# Patient Record
Sex: FEMALE | Race: WHITE | HISPANIC OR LATINO | Employment: STUDENT | URBAN - METROPOLITAN AREA
[De-identification: names, ages, dates, MRNs, and addresses within clinical notes are randomized per-mention and may not be internally consistent; named-entity substitution may affect disease eponyms.]

---

## 2017-06-06 ENCOUNTER — ALLSCRIPTS OFFICE VISIT (OUTPATIENT)
Dept: OTHER | Facility: OTHER | Age: 42
End: 2017-06-06

## 2017-06-06 DIAGNOSIS — Z12.31 ENCOUNTER FOR SCREENING MAMMOGRAM FOR MALIGNANT NEOPLASM OF BREAST: ICD-10-CM

## 2017-07-21 ENCOUNTER — ALLSCRIPTS OFFICE VISIT (OUTPATIENT)
Dept: OTHER | Facility: OTHER | Age: 42
End: 2017-07-21
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
## 2018-01-10 NOTE — PROGRESS NOTES
Assessment    1  Exposure to chickenpox (V01 71) (Z20 820)   2  Exposure to hepatitis B (V01 79) (Z20 5)   3  ADHD (attention deficit hyperactivity disorder), combined type (314 01) (F90 2)   4  Allergic rhinitis due to pollen (477 0) (J30 1)   5  Need for prophylactic vaccination and inoculation against influenza (V04 81) (Z23)   6  Pre-employment examination (V70 5) (Z02 1)    Plan  ADHD (attention deficit hyperactivity disorder), combined type    · Methylphenidate HCl - 20 MG Oral Tablet (Ritalin); TAKE 1 TABLET DAILY AS  DIRECTED   · Methylphenidate HCl ER 54 MG Oral Tablet Extended Release; TAKE 1 TABLET  ONCE DAILY  Allergic rhinitis due to pollen    · Fluticasone Propionate 50 MCG/ACT Nasal Suspension; USE 2 SPRAYS IN EACH  NOSTRIL ONCE DAILY   · Loratadine 10 MG Oral Tablet; TAKE 1 TABLET DAILY AS NEEDED  Exposure to chickenpox    · (1) VARICELLA ZOSTER IMMUNITY(IGG); Status:Active; Requested [id number];   Exposure to hepatitis B    · (Q) HEPATITIS B SURFACE ANTIBODY QL; Status:Active; Requested [id number]; Health Maintenance    · PPD  Insomnia    · Zolpidem Tartrate 10 MG Oral Tablet; TAKE 1 TABLET AT BEDTIME AS NEEDED  FOR INSOMNIA  Need for prophylactic vaccination and inoculation against influenza    · Fluzone Quadrivalent 0 5 ML Intramuscular Suspension    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      History of Present Illness  HM, Adult Female: The patient is being seen for a health maintenance evaluation  The last health maintenance visit was month(s) ago  General Health:   Screening:   HPI: She has ADHD as diagnosed by Dr Maxine Ambriz in [place]  She is on Concerta and Ritalin without side effects  She has no anxiety, insomnia, or palpitations  She is doing well on these medications  Her grades are improving on her current regimen  Unfortunately, her psychiatrist is no longer going to be in the area and she asked that we take over her prescriptions  She complains cough and coryza  She's had runny nose, itchy eyes, sputum production for the past week or so  She believes this is her allergies  She is not taking anything for currently  She is afraid of taking anything that would be sedating  Review of Systems    Constitutional: No fever, no chills, feels well, no tiredness, no recent weight gain or weight loss  Eyes: No complaints of eye pain, no red eyes, no eyesight problems, no discharge, no dry eyes, no itching of eyes  ENT: as noted in HPI  Cardiovascular: No complaints of slow heart rate, no fast heart rate, no chest pain, no palpitations, no leg claudication, no lower extremity edema  Respiratory: No complaints of shortness of breath, no wheezing, no cough, no SOB on exertion, no orthopnea, no PND  Gastrointestinal: No complaints of abdominal pain, no constipation, no nausea or vomiting, no diarrhea, no bloody stools  Genitourinary: No complaints of dysuria, no incontinence, no pelvic pain, no dysmenorrhea, no vaginal discharge or bleeding  Musculoskeletal: No complaints of arthralgias, no myalgias, no joint swelling or stiffness, no limb pain or swelling  Integumentary: No complaints of skin rash or lesions, no itching, no skin wounds, no breast pain or lump  Neurological: No complaints of headache, no confusion, no convulsions, no numbness, no dizziness or fainting, no tingling, no limb weakness, no difficulty walking  Psychiatric: Not suicidal, no sleep disturbance, no anxiety or depression, no change in personality, no emotional problems  Endocrine: No complaints of proptosis, no hot flashes, no muscle weakness, no deepening of the voice, no feelings of weakness  Hematologic/Lymphatic: No complaints of swollen glands, no swollen glands in the neck, does not bleed easily, does not bruise easily  Active Problems    1  Anxiety disorder (300 00) (F41 9)   2  Common migraine without aura (346 10) (G43 009)   3  Esophageal reflux (530 81) (K21 9)   4  Galactorrhea not associated with childbirth (611 6) (N64 3)   5  Hyperglycemia (790 29) (R73 9)   6  Insomnia (780 52) (G47 00)    Past Medical History    · History of Diabetes During Pregnancy   · History of herpes labialis (V12 09) (Z86 19)   · History of influenza (V12 09) (Z87 09)   · History of low back pain (V13 59) (Z87 39)   · History of sprain of ankle (V13 59) (Z87 828)   · History of Shoulder joint pain, unspecified laterality   · History of Skin rash (782 1) (R21)    Surgical History    · History of Tonsillectomy With Adenoidectomy    Family History  Mother    · Family history of Anxiety Disorder NOS   · Family history of Depression  Father    · Family history of Diabetes Mellitus (V18 0)  Son    · Family history of Chromosomal Anomalies  Brother    · Family history of Diabetes Mellitus (V18 0)   · Family history of Diabetes Mellitus (V18 0)    Social History    · Never A Smoker    Current Meds   1  Methylphenidate HCl ER 54 MG Oral Tablet Extended Release; TAKE 1 TABLET ONCE   DAILY; Therapy: (Recorded:24Smb7507) to Recorded   2  Ritalin 20 MG Oral Tablet; TAKE 1 TABLET DAILY AS DIRECTED; Therapy: (Recorded:02Xak9917) to Recorded   3  Zolpidem Tartrate 10 MG Oral Tablet; TAKE 1 TABLET AT BEDTIME AS NEEDED FOR   INSOMNIA; Therapy: 63VHH9695 to (Dayday Bhakta)  Requested for: 44LKW6464; Last   IE:11SLB8715 Ordered    Allergies    1  No Known Drug Allergies    Vitals   Recorded: 02Jun2016 11:33AM   Heart Rate 87   Respiration 12   Systolic 563   Diastolic 78   Height 5 ft 5 in   Weight 151 lb 4 oz   BMI Calculated 25 17   BSA Calculated 1 76   O2 Saturation 99     Physical Exam    Constitutional   General appearance: No acute distress, well appearing and well nourished  Pulmonary   Respiratory effort: No increased work of breathing or signs of respiratory distress  Auscultation of lungs: Clear to auscultation      Cardiovascular   Auscultation of heart: Normal rate and rhythm, normal S1 and S2, no murmurs  Carotid pulses: 2+ bilaterally  Abdomen   Abdomen: Non-tender, no masses  Liver and spleen: No hepatomegaly or splenomegaly         Future Appointments    Date/Time Provider Specialty Site   06/14/2016 09:00 AM [facility], Nurse Schedule  [address] [place] [address]     Signatures   Electronically signed by : Jude Reyes MD; Jun 2 2016  3:03PM EST                       (Author)

## 2018-01-12 VITALS
OXYGEN SATURATION: 98 % | RESPIRATION RATE: 14 BRPM | WEIGHT: 159 LBS | HEART RATE: 83 BPM | SYSTOLIC BLOOD PRESSURE: 118 MMHG | BODY MASS INDEX: 26.49 KG/M2 | DIASTOLIC BLOOD PRESSURE: 70 MMHG | HEIGHT: 65 IN

## 2018-01-12 NOTE — PROGRESS NOTES
Chief Complaint  PPD PLACED ON PT, HEP B GIVEN ALSO      Active Problems    1  ADHD (attention deficit hyperactivity disorder), combined type (314 01) (F90 2)   2  Allergic rhinitis due to pollen (477 0) (J30 1)   3  Anxiety disorder (300 00) (F41 9)   4  Common migraine without aura (346 10) (G43 009)   5  Esophageal reflux (530 81) (K21 9)   6  Exposure to chickenpox (V01 71) (Z20 820)   7  Exposure to hepatitis B (V01 79) (Z20 5)   8  Galactorrhea not associated with childbirth (611 6) (N64 3)   9  Hyperglycemia (790 29) (R73 9)   10  Insomnia (780 52) (G47 00)   11  Need for prophylactic vaccination and inoculation against influenza (V04 81) (Z23)   12  Pre-employment examination (V70 5) (Z02 1)    Current Meds   1  Fluticasone Propionate 50 MCG/ACT Nasal Suspension; USE 2 SPRAYS IN EACH   NOSTRIL ONCE DAILY; Therapy: 31NXV6535 to (Last Rx:02Jun2016)  Requested for: 02Jun2016 Ordered   2  Loratadine 10 MG Oral Tablet; TAKE 1 TABLET DAILY AS NEEDED; Therapy: 97HRG4163 to (Huan Gandhi)  Requested for: 22MJQ0179; Last   Rx:02Jun2016 Ordered   3  Methylphenidate HCl - 20 MG Oral Tablet (Ritalin); TAKE 1 TABLET DAILY AS   DIRECTED; Last Rx:02Jun2016 Ordered   4  Methylphenidate HCl ER 54 MG Oral Tablet Extended Release; TAKE 1 TABLET ONCE   DAILY; Last Rx:02Jun2016 Ordered   5  Zolpidem Tartrate 10 MG Oral Tablet; TAKE 1 TABLET AT BEDTIME AS NEEDED FOR   INSOMNIA; Therapy: 77KMD6112 to (Huan Gandhi)  Requested for: 66CJX2848; Last   Rx:02Jun2016 Ordered    Allergies    1   No Known Drug Allergies    Plan  Exposure to hepatitis B    · Hep B (Recombinant)    Signatures   Electronically signed by : Deana Palacios MD; Jul 8 2016  9:33AM EST                       (Author)

## 2018-01-13 NOTE — PROGRESS NOTES
Assessment    1  Encounter for preventive health examination (V70 0) (Z00 00)    Plan  Encounter for screening mammogram for malignant neoplasm of breast    · * MAMMO SCREENING BILATERAL W CAD; Status:Hold For - Scheduling,Retrospective  By Protocol Authorization; Requested [id number]; Health Maintenance    · Begin a limited exercise program ; Status:Complete;   Done: 94TKU5070   · Begin or continue regular aerobic exercise  Gradually work up to at least 3 sessions of 30  minutes of exercise a week ; Status:Complete;   Done: 45QKY5599   · Brush your teeth 3 times a day and floss at least once a day ; Status:Complete;   Done:  35LDC5347   · Eat a low fat and low cholesterol diet ; Status:Complete;   Done: 15FGT5028   · Keep a diary of when and what you eat ; Status:Complete;   Done: 63RYC9347   · Limit your use of alcohol to 2 drinks or cans of beer a day ; Status:Complete;   Done:  96JIE3097   · Schedule an appointment in 48-72 hours to have your TB (tuberculin) skin test  interpreted by a trained healthcare provider ; Status:Complete;   Done: 07CUS6596   · Some eating tips that can help you lose weight ; Status:Complete;   Done: 64PWN6992   · Stretch and warm up your muscles during the first 10 minutes , then cool down your  muscles for the last 10 minutes of exercise ; Status:Complete;   Done: 75KBS0301   · There are ways to decrease your stress and improve your sense of well-being  We  encourage you to keep active and exercise regularly  Make time to take care of yourself  and participate in activities that you enjoy  Stay connected to friends and family that can  support and comfort you  If at any time you have thoughts of harming yourself or  someone else, contact us immediately ; Status:Active; Requested [id number];    · We encourage all of our patients to exercise regularly    30 minutes of exercise or physical  activity five or more days a week is recommended for children and adults ;  Status:Complete;   Done: 98ZZV9321   · We encourage you to begin to make lifestyle changes to help control your blood  pressure  These may include losing weight, increasing your activity level, limiting salt in  your diet, decreasing alcohol intake, and eating a diet low in fat and rich in fruits  and vegetables ; Status:Complete;   Done: 96YDQ3192   · We recommend routine visits to a dentist ; Status:Complete;   Done: 13WKQ7159   · We recommend that you bring your body mass index down to 26 ; Status:Complete;    Done: 35NGZ1724   · We recommend you modify your diet to achieve and maintain a healthy weight  Being  overweight may increase your risk for developing health problems such as diabetes,  heart disease, and cancer  Avoid high fat foods and eat a balanced diet rich  in fruits and vegetables  The combination of a reduced-calorie diet and increased  physical activity is recommended  Please let us know if you would like to  learn more about your nutrition and calorie needs, and additional options including  weight loss programs that can help you achieve your goals ; Status:Complete;   Done:  03KEE2659   · We recommend you modify your diet to achieve and maintain a healthy weight  Being  underweight may increase your risk of developing health problems from vitamin and  mineral deficiencies  We recommend a balanced diet rich in fruits and vegetables  You  may also consider increasing your calorie intake by eating more frequently or adding  nuts, avocados, and low-fat cheese or milk to your meals    Please let us know  if you would like to learn more about your nutrition and calorie needs, and additional  options to help you achieve your weight goals ; Status:Complete;   Done: 32UPO7407   · Call [phone] if: You find a new or different kind of lump in your breast ;  Status:Complete;   Done: 92VFL8628   · Call [phone] if: You have any warning signs of skin cancer ; Status:Complete;    Done: 73YJC1847   · *VB-Depression Screening; Status:Resulted - Requires Verification,Retrospective By  Protocol Authorization;   Done: 19WSB5838 11:40AM  Need for prophylactic vaccination and inoculation against influenza    · PPD    Discussion/Summary  health maintenance visit Currently, she eats a healthy diet  cervical cancer screening is current Breast cancer screening: the risks and benefits of breast cancer screening were discussed and mammogram has been ordered  Colorectal cancer screening: colorectal cancer screening is not indicated  Osteoporosis screening: bone mineral density testing is not indicated  Patient discussion: discussed with the patient  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  HAS PAPER TO BE FILLED OUT      History of Present Illness  HM, Adult Female: The patient is being seen for a health maintenance evaluation  The last health maintenance visit was 12 month(s) ago  Social History: Household members include 3 daughter(s) and 1 son(s)  She is   Work status: working full time  The patient has never smoked cigarettes  She reports occasional alcohol use  She has never used illicit drugs  General Health: The patient's health since the last visit is described as good  She has regular dental visits  She denies vision problems  She denies hearing loss  Immunizations status: up to date  Lifestyle:  She consumes a diverse and healthy diet  She does not have any weight concerns  She does not exercise regularly  She does not use tobacco  She consumes alcohol  She denies drug use  Reproductive health: the patient is premenopausal   she reports abnormal menses  she uses no contraception  she is not sexually active  she denies prior pregnancies  Screening:   metabolic screening reviewed and updated       Cardiovascular risk factors: no hypertension, no diabetes, no high LDL cholesterol, no low HDL cholesterol, no stress, no obesity, no tobacco use, no illicit drug use, no sedentary lifestyle and no family history of cardiovascular disease  General health risks: no previous breast cancer, no abnormal cervical cytology, no positive screening for human papilloma virus, no hormone therapy, no diethylstilbestrol (MELIA) exposure in utero, no previous colon polyps, no inflammatory bowel disease, no osteoporosis risk factors, no asbestos exposure, no radiation exposure and no occupational exposure to  Safety elements used: seat belt, safe driving habits, sunscreen, smoke detector, carbon monoxide detector, hot water temperature less than 120 degrees F, bathroom grab bars and fall prevention measures  Risk findings: anxiety symptoms and depression symptoms, but no passive smoke exposure, no unsafe sexual behavior, no chemical abuse, no domestic violence, no guns at home, no parenting concerns, no caregiver concerns, no stress management concerns, no anger management concerns, no unsafe driving habits, no multiple driving violations, no history of DUI / DWI, no memory loss, no falls risk, no osteoporosis risk factors, no occupational exposure, no travel to developing areas and no tuberculosis exposure  Review of Systems    Constitutional: No fever, no chills, feels well, no tiredness, no recent weight gain or weight loss  Eyes: No complaints of eye pain, no red eyes, no eyesight problems, no discharge, no dry eyes, no itching of eyes  ENT: no complaints of earache, no loss of hearing, no nose bleeds, no nasal discharge, no sore throat, no hoarseness  Cardiovascular: No complaints of slow heart rate, no fast heart rate, no chest pain, no palpitations, no leg claudication, no lower extremity edema  Respiratory: No complaints of shortness of breath, no wheezing, no cough, no SOB on exertion, no orthopnea, no PND  Gastrointestinal: No complaints of abdominal pain, no constipation, no nausea or vomiting, no diarrhea, no bloody stools     Genitourinary: No complaints of dysuria, no incontinence, no pelvic pain, no dysmenorrhea, no vaginal discharge or bleeding  Musculoskeletal: No complaints of arthralgias, no myalgias, no joint swelling or stiffness, no limb pain or swelling  Integumentary: No complaints of skin rash or lesions, no itching, no skin wounds, no breast pain or lump  Neurological: No complaints of headache, no confusion, no convulsions, no numbness, no dizziness or fainting, no tingling, no limb weakness, no difficulty walking  Psychiatric: Not suicidal, no sleep disturbance, no anxiety or depression, no change in personality, no emotional problems  Endocrine: No complaints of proptosis, no hot flashes, no muscle weakness, no deepening of the voice, no feelings of weakness  Hematologic/Lymphatic: No complaints of swollen glands, no swollen glands in the neck, does not bleed easily, does not bruise easily  Active Problems    1  ADHD (attention deficit hyperactivity disorder), combined type (314 01) (F90 2)   2  Allergic rhinitis due to pollen (477 0) (J30 1)   3  Anxiety disorder (300 00) (F41 9)   4  Common migraine without aura (346 10) (G43 009)   5  Esophageal reflux (530 81) (K21 9)   6  Exposure to chickenpox (V01 71) (Z20 820)   7  Exposure to hepatitis B (V01 79) (Z20 5)   8  Galactorrhea not associated with childbirth (611 6) (N64 3)   9  Hyperglycemia (790 29) (R73 9)   10  Insomnia (780 52) (G47 00)   11   Pre-employment examination (V70 5) (Z02 1)    Past Medical History    · History of Diabetes During Pregnancy   · History of herpes labialis (V12 09) (Z86 19)   · History of influenza (V12 09) (Z87 09)   · History of low back pain (V13 59) (Z87 39)   · History of sprain of ankle (V13 59) (R63 508)   · History of Shoulder joint pain, unspecified laterality   · History of Skin rash (782 1) (R21)    Surgical History    · History of Tonsillectomy With Adenoidectomy    Family History  Mother    · Family history of Anxiety Disorder NOS   · Family history of Depression  Father    · Family history of Diabetes Mellitus (V18 0)  Son    · Family history of Chromosomal Anomalies  Brother    · Family history of Diabetes Mellitus (V18 0)   · Family history of Diabetes Mellitus (V18 0)    Social History    · Never A Smoker    Current Meds   1  BuPROPion HCl ER (SR) 100 MG Oral Tablet Extended Release 12 Hour; Therapy: 47FSX0376 to Recorded   2  Fluticasone Propionate 50 MCG/ACT Nasal Suspension; instill 2 sprays into each nostril   once daily; Therapy: 54STZ2064 to (Evaluate:17Aug2017)  Requested for: 19Apr2017; Last   Rx:19Apr2017 Ordered   3  Loratadine 10 MG Oral Tablet; TAKE 1 TABLET DAILY AS NEEDED; Therapy: 97VIR1743 to (Adriana Luo)  Requested for: 46SES0657; Last   Rx:02Jun2016 Ordered   4  Methylphenidate HCl - 20 MG Oral Tablet; TAKE 1 TABLET DAILY AS DIRECTED; Last   Rx:19Ozw2483 Ordered   5  Methylphenidate HCl ER 54 MG Oral Tablet Extended Release; TAKE 1 TABLET ONCE   DAILY; Last Rx:16Nov2016 Ordered   6  MethylPREDNISolone 4 MG Oral Tablet Therapy Pack; take as directed; Therapy: 73Crr3844 to (Last Rx:37Ter3012)  Requested for: 84Wxy6911 Ordered   7  SUMAtriptan Succinate 25 MG Oral Tablet; take as directed may repeat X1 DOSE; Therapy: 63WBC6979 to (Evaluate:17Oct2016)  Requested for: 90VFY9337; Last   Rx:11Xbq3338 Ordered   8  Zolpidem Tartrate 10 MG Oral Tablet; take 1 tablet by mouth at bedtime if needed for   insomnia; Therapy: 67YQP5688 to (Candice Borges)  Requested for: 33TRL4399; Last   Rx:23Jan2017 Ordered    Allergies    1  No Known Drug Allergies    Vitals   Recorded: 59QMW8611 11:12AM   Heart Rate 83   Respiration 14   Systolic 041   Diastolic 70   Height 5 ft 5 in   Weight 159 lb    BMI Calculated 26 46   BSA Calculated 1 79   O2 Saturation 98     Physical Exam    Constitutional   General appearance: No acute distress, well appearing and well nourished  Neck   Neck: Supple, symmetric, trachea midline, no masses      Thyroid: Normal, no thyromegaly  Pulmonary   Respiratory effort: No increased work of breathing or signs of respiratory distress  Auscultation of lungs: Clear to auscultation  Cardiovascular   Auscultation of heart: Normal rate and rhythm, normal S1 and S2, no murmurs  Carotid pulses: 2+ bilaterally  Abdominal aorta: Normal     Abdomen   Abdomen: Non-tender, no masses  Liver and spleen: No hepatomegaly or splenomegaly  Neurologic   Cranial nerves: Cranial nerves II-XII intact  Cortical function: Normal mental status  Reflexes: 2+ and symmetric  Sensation: No sensory loss  Coordination: Normal finger to nose and heel to shin         Results/Data  *VB-Depression Screening 72IPB1756 11:40AM Emre Josh     Test Name Result Flag Reference   Depression Scale Result      Depression Screen - Positive Findings                         Signatures   Electronically signed by : Fahad Medina MD; Jun 6 2017  2:42PM EST                       (Author)

## 2018-01-14 VITALS
SYSTOLIC BLOOD PRESSURE: 124 MMHG | WEIGHT: 157.38 LBS | BODY MASS INDEX: 26.22 KG/M2 | HEIGHT: 65 IN | HEART RATE: 87 BPM | OXYGEN SATURATION: 98 % | RESPIRATION RATE: 15 BRPM | DIASTOLIC BLOOD PRESSURE: 70 MMHG

## 2018-05-26 DIAGNOSIS — G43.809 OTHER MIGRAINE WITHOUT STATUS MIGRAINOSUS, NOT INTRACTABLE: Primary | ICD-10-CM

## 2018-05-28 DIAGNOSIS — G43.909 MIGRAINE WITHOUT STATUS MIGRAINOSUS, NOT INTRACTABLE, UNSPECIFIED MIGRAINE TYPE: Primary | ICD-10-CM

## 2018-05-28 RX ORDER — METHYLPREDNISOLONE 4 MG/1
TABLET ORAL
Qty: 21 TABLET | Refills: 5 | Status: SHIPPED | OUTPATIENT
Start: 2018-05-28 | End: 2019-04-23

## 2018-05-28 RX ORDER — SUMATRIPTAN 25 MG/1
TABLET, FILM COATED ORAL
Qty: 9 TABLET | Refills: 5 | Status: SHIPPED | OUTPATIENT
Start: 2018-05-28 | End: 2019-04-23

## 2019-03-11 DIAGNOSIS — B00.1 COLD SORE: Primary | ICD-10-CM

## 2019-03-11 RX ORDER — VALACYCLOVIR HYDROCHLORIDE 500 MG/1
TABLET, FILM COATED ORAL
Qty: 30 TABLET | Refills: 5 | Status: SHIPPED | OUTPATIENT
Start: 2019-03-11 | End: 2019-09-01 | Stop reason: SDUPTHER

## 2019-04-23 ENCOUNTER — OFFICE VISIT (OUTPATIENT)
Dept: FAMILY MEDICINE CLINIC | Facility: CLINIC | Age: 44
End: 2019-04-23
Payer: COMMERCIAL

## 2019-04-23 VITALS
SYSTOLIC BLOOD PRESSURE: 106 MMHG | RESPIRATION RATE: 15 BRPM | OXYGEN SATURATION: 98 % | DIASTOLIC BLOOD PRESSURE: 64 MMHG | HEIGHT: 65 IN | WEIGHT: 157.6 LBS | HEART RATE: 86 BPM | BODY MASS INDEX: 26.26 KG/M2

## 2019-04-23 DIAGNOSIS — F33.40 RECURRENT MAJOR DEPRESSIVE DISORDER, IN REMISSION (HCC): Primary | ICD-10-CM

## 2019-04-23 DIAGNOSIS — R63.5 WEIGHT GAIN: ICD-10-CM

## 2019-04-23 LAB
ALBUMIN SERPL BCP-MCNC: 3.9 G/DL (ref 3.5–5)
ALP SERPL-CCNC: 66 U/L (ref 46–116)
ALT SERPL W P-5'-P-CCNC: 31 U/L (ref 12–78)
ANION GAP SERPL CALCULATED.3IONS-SCNC: 5 MMOL/L (ref 4–13)
AST SERPL W P-5'-P-CCNC: 14 U/L (ref 5–45)
BASOPHILS # BLD AUTO: 0.03 THOUSANDS/ΜL (ref 0–0.1)
BASOPHILS NFR BLD AUTO: 0 % (ref 0–1)
BILIRUB SERPL-MCNC: 0.54 MG/DL (ref 0.2–1)
BUN SERPL-MCNC: 16 MG/DL (ref 5–25)
CALCIUM SERPL-MCNC: 8.3 MG/DL (ref 8.3–10.1)
CHLORIDE SERPL-SCNC: 108 MMOL/L (ref 100–108)
CO2 SERPL-SCNC: 26 MMOL/L (ref 21–32)
CREAT SERPL-MCNC: 0.85 MG/DL (ref 0.6–1.3)
EOSINOPHIL # BLD AUTO: 0.13 THOUSAND/ΜL (ref 0–0.61)
EOSINOPHIL NFR BLD AUTO: 2 % (ref 0–6)
ERYTHROCYTE [DISTWIDTH] IN BLOOD BY AUTOMATED COUNT: 13.2 % (ref 11.6–15.1)
GFR SERPL CREATININE-BSD FRML MDRD: 84 ML/MIN/1.73SQ M
GLUCOSE SERPL-MCNC: 84 MG/DL (ref 65–140)
HCT VFR BLD AUTO: 42.6 % (ref 34.8–46.1)
HGB BLD-MCNC: 13.8 G/DL (ref 11.5–15.4)
IMM GRANULOCYTES # BLD AUTO: 0.01 THOUSAND/UL (ref 0–0.2)
IMM GRANULOCYTES NFR BLD AUTO: 0 % (ref 0–2)
LYMPHOCYTES # BLD AUTO: 1.68 THOUSANDS/ΜL (ref 0.6–4.47)
LYMPHOCYTES NFR BLD AUTO: 24 % (ref 14–44)
MCH RBC QN AUTO: 31.3 PG (ref 26.8–34.3)
MCHC RBC AUTO-ENTMCNC: 32.4 G/DL (ref 31.4–37.4)
MCV RBC AUTO: 97 FL (ref 82–98)
MONOCYTES # BLD AUTO: 0.71 THOUSAND/ΜL (ref 0.17–1.22)
MONOCYTES NFR BLD AUTO: 10 % (ref 4–12)
NEUTROPHILS # BLD AUTO: 4.48 THOUSANDS/ΜL (ref 1.85–7.62)
NEUTS SEG NFR BLD AUTO: 64 % (ref 43–75)
NRBC BLD AUTO-RTO: 0 /100 WBCS
PLATELET # BLD AUTO: 274 THOUSANDS/UL (ref 149–390)
PMV BLD AUTO: 12.1 FL (ref 8.9–12.7)
POTASSIUM SERPL-SCNC: 4.2 MMOL/L (ref 3.5–5.3)
PROT SERPL-MCNC: 7.2 G/DL (ref 6.4–8.2)
RBC # BLD AUTO: 4.41 MILLION/UL (ref 3.81–5.12)
SODIUM SERPL-SCNC: 139 MMOL/L (ref 136–145)
TSH SERPL DL<=0.05 MIU/L-ACNC: 0.86 UIU/ML (ref 0.36–3.74)
WBC # BLD AUTO: 7.04 THOUSAND/UL (ref 4.31–10.16)

## 2019-04-23 PROCEDURE — 85025 COMPLETE CBC W/AUTO DIFF WBC: CPT | Performed by: FAMILY MEDICINE

## 2019-04-23 PROCEDURE — 84443 ASSAY THYROID STIM HORMONE: CPT | Performed by: FAMILY MEDICINE

## 2019-04-23 PROCEDURE — 80053 COMPREHEN METABOLIC PANEL: CPT | Performed by: FAMILY MEDICINE

## 2019-04-23 PROCEDURE — 36415 COLL VENOUS BLD VENIPUNCTURE: CPT | Performed by: FAMILY MEDICINE

## 2019-04-23 PROCEDURE — 99213 OFFICE O/P EST LOW 20 MIN: CPT | Performed by: FAMILY MEDICINE

## 2019-04-23 PROCEDURE — 1036F TOBACCO NON-USER: CPT | Performed by: FAMILY MEDICINE

## 2019-04-23 PROCEDURE — 3008F BODY MASS INDEX DOCD: CPT | Performed by: FAMILY MEDICINE

## 2019-04-23 RX ORDER — BUPROPION HYDROCHLORIDE 150 MG/1
150 TABLET ORAL EVERY MORNING
COMMUNITY
End: 2019-04-23

## 2019-04-23 RX ORDER — BUPROPION HYDROCHLORIDE 300 MG/1
300 TABLET ORAL DAILY
Qty: 30 TABLET | Refills: 5 | Status: SHIPPED | OUTPATIENT
Start: 2019-04-23

## 2019-04-23 RX ORDER — METHYLPHENIDATE HYDROCHLORIDE 54 MG/1
54 TABLET ORAL DAILY
COMMUNITY
End: 2022-02-18 | Stop reason: ALTCHOICE

## 2019-09-01 DIAGNOSIS — B00.1 COLD SORE: ICD-10-CM

## 2019-09-03 RX ORDER — VALACYCLOVIR HYDROCHLORIDE 500 MG/1
TABLET, FILM COATED ORAL
Qty: 30 TABLET | Refills: 5 | Status: SHIPPED | OUTPATIENT
Start: 2019-09-03 | End: 2020-12-15 | Stop reason: SDUPTHER

## 2019-10-21 RX ORDER — ZOLPIDEM TARTRATE 10 MG/1
10 TABLET ORAL EVERY EVENING
Refills: 0 | COMMUNITY
Start: 2019-08-05 | End: 2019-10-24

## 2019-10-23 RX ORDER — TRIAMCINOLONE ACETONIDE 1 MG/G
CREAM TOPICAL
COMMUNITY
Start: 2009-11-19 | End: 2022-07-11 | Stop reason: ALTCHOICE

## 2019-10-24 ENCOUNTER — OFFICE VISIT (OUTPATIENT)
Dept: FAMILY MEDICINE CLINIC | Facility: CLINIC | Age: 44
End: 2019-10-24
Payer: COMMERCIAL

## 2019-10-24 VITALS
BODY MASS INDEX: 24.66 KG/M2 | SYSTOLIC BLOOD PRESSURE: 110 MMHG | HEIGHT: 65 IN | DIASTOLIC BLOOD PRESSURE: 64 MMHG | WEIGHT: 148 LBS

## 2019-10-24 DIAGNOSIS — Z12.39 SCREENING FOR BREAST CANCER: ICD-10-CM

## 2019-10-24 DIAGNOSIS — R05.9 COUGH: ICD-10-CM

## 2019-10-24 DIAGNOSIS — L21.0 DANDRUFF IN ADULT: Primary | ICD-10-CM

## 2019-10-24 DIAGNOSIS — G43.809 OTHER MIGRAINE WITHOUT STATUS MIGRAINOSUS, NOT INTRACTABLE: ICD-10-CM

## 2019-10-24 PROCEDURE — 3008F BODY MASS INDEX DOCD: CPT | Performed by: FAMILY MEDICINE

## 2019-10-24 PROCEDURE — 99213 OFFICE O/P EST LOW 20 MIN: CPT | Performed by: FAMILY MEDICINE

## 2019-10-24 RX ORDER — VENLAFAXINE HYDROCHLORIDE 37.5 MG/1
37.5 CAPSULE, EXTENDED RELEASE ORAL EVERY MORNING
Refills: 0 | COMMUNITY
Start: 2019-10-02 | End: 2019-10-24

## 2019-10-24 RX ORDER — BUPROPION HYDROCHLORIDE 150 MG/1
150 TABLET ORAL EVERY MORNING
Refills: 0 | COMMUNITY
Start: 2019-10-02 | End: 2019-10-24

## 2019-10-24 RX ORDER — BENZONATATE 100 MG/1
100 CAPSULE ORAL 3 TIMES DAILY PRN
Qty: 20 CAPSULE | Refills: 0 | Status: SHIPPED | OUTPATIENT
Start: 2019-10-24 | End: 2022-01-06

## 2019-10-24 RX ORDER — CARBAMAZEPINE 100 MG/1
100 TABLET, EXTENDED RELEASE ORAL EVERY EVENING
Refills: 0 | COMMUNITY
Start: 2019-10-02 | End: 2019-10-24 | Stop reason: SDUPTHER

## 2019-10-24 RX ORDER — SUMATRIPTAN 100 MG/1
100 TABLET, FILM COATED ORAL ONCE AS NEEDED
Qty: 12 TABLET | Refills: 5 | Status: SHIPPED | OUTPATIENT
Start: 2019-10-24 | End: 2021-01-24 | Stop reason: SDUPTHER

## 2019-10-24 RX ORDER — SELENIUM SULFIDE 2.5 MG/100ML
LOTION TOPICAL DAILY PRN
Qty: 118 ML | Refills: 5 | Status: SHIPPED | OUTPATIENT
Start: 2019-10-24 | End: 2022-07-13 | Stop reason: SDUPTHER

## 2019-10-24 RX ORDER — CARBAMAZEPINE 100 MG/1
100 TABLET, EXTENDED RELEASE ORAL EVERY EVENING
Qty: 180 TABLET | Refills: 3 | Status: SHIPPED | OUTPATIENT
Start: 2019-10-24 | End: 2022-07-11 | Stop reason: ALTCHOICE

## 2019-10-24 NOTE — PROGRESS NOTES
Assessment/Plan:    No problem-specific Assessment & Plan notes found for this encounter  Diagnoses and all orders for this visit:    Dandruff in adult  -     selenium sulfide (SELSUN) 2 5 % shampoo; Apply topically daily as needed for dandruff    Screening for breast cancer  -     Mammo screening bilateral w cad; Future    Other migraine without status migrainosus, not intractable  -     carBAMazepine (TEGretol XR) 100 mg 12 hr tablet; Take 1 tablet (100 mg total) by mouth every evening  -     SUMAtriptan (IMITREX) 100 mg tablet; Take 1 tablet (100 mg total) by mouth once as needed for migraine for up to 1 dose  -     Riboflavin 400 MG CAPS; Take 1 capsule (400 mg total) by mouth daily    Cough  -     benzonatate (TESSALON PERLES) 100 mg capsule; Take 1 capsule (100 mg total) by mouth 3 (three) times a day as needed for cough    Other orders  -     Discontinue: buPROPion (WELLBUTRIN XL) 150 mg 24 hr tablet; Take 150 mg by mouth every morning  -     Discontinue: carBAMazepine (TEGretol XR) 100 mg 12 hr tablet; Take 100 mg by mouth every evening  -     Discontinue: venlafaxine (EFFEXOR-XR) 37 5 mg 24 hr capsule; Take 37 5 mg by mouth every morning          Subjective:      Patient ID: Manuel Monroy is a 40 y o  female  Her migraines have increased in severity and frequency  She is not currently taking Tegretol and feels that reinstituting this would be of benefit  She has multiple migraines a month and the affect her ability to work and take care of her children  She is having increased dandruff symptoms  She is using multiple over-the-counter agents without much benefit  She is cough cold congestion  She is taking Mucinex with some benefit  The cough seems to be her biggest complaint at this point in time        The following portions of the patient's history were reviewed and updated as appropriate:   She  has a past medical history of Diabetes mellitus during pregnancy and Herpes labialis  She   Patient Active Problem List    Diagnosis Date Noted    BMI 26 0-26 9,adult 04/23/2019    Weight gain 04/23/2019    ADHD (attention deficit hyperactivity disorder), combined type 06/02/2016    Insomnia 01/25/2013    Common migraine without aura 11/06/2012     She  has a past surgical history that includes Tonsillectomy and adenoidectomy  Her family history includes Anxiety disorder in her mother; Chromosomal disorder in her son; Depression in her mother; Diabetes in her brother and father  She  reports that she has never smoked  She has never used smokeless tobacco  Her alcohol and drug histories are not on file  Current Outpatient Medications   Medication Sig Dispense Refill    benzonatate (TESSALON PERLES) 100 mg capsule Take 1 capsule (100 mg total) by mouth 3 (three) times a day as needed for cough 20 capsule 0    buPROPion (WELLBUTRIN XL) 300 mg 24 hr tablet Take 1 tablet (300 mg total) by mouth daily 30 tablet 5    carBAMazepine (TEGretol XR) 100 mg 12 hr tablet Take 1 tablet (100 mg total) by mouth every evening 180 tablet 3    Flurandrenolide (CORDRAN) 4 MCG/SQCM TAPE Apply topically      methylphenidate (CONCERTA) 54 MG ER tablet Take 54 mg by mouth daily      Riboflavin 400 MG CAPS Take 1 capsule (400 mg total) by mouth daily 90 capsule 3    selenium sulfide (SELSUN) 2 5 % shampoo Apply topically daily as needed for dandruff 118 mL 5    SUMAtriptan (IMITREX) 100 mg tablet Take 1 tablet (100 mg total) by mouth once as needed for migraine for up to 1 dose 12 tablet 5    triamcinolone (KENALOG) 0 1 % cream Apply topically      valACYclovir (VALTREX) 500 mg tablet take 1 tablet by mouth once daily 30 tablet 5    Zolpidem Tartrate (AMBIEN PO) Take by mouth       No current facility-administered medications for this visit        Current Outpatient Medications on File Prior to Visit   Medication Sig    buPROPion (WELLBUTRIN XL) 300 mg 24 hr tablet Take 1 tablet (300 mg total) by mouth daily    Flurandrenolide (CORDRAN) 4 MCG/SQCM TAPE Apply topically    methylphenidate (CONCERTA) 54 MG ER tablet Take 54 mg by mouth daily    triamcinolone (KENALOG) 0 1 % cream Apply topically    valACYclovir (VALTREX) 500 mg tablet take 1 tablet by mouth once daily    Zolpidem Tartrate (AMBIEN PO) Take by mouth    [DISCONTINUED] buPROPion (WELLBUTRIN XL) 150 mg 24 hr tablet Take 150 mg by mouth every morning    [DISCONTINUED] carBAMazepine (TEGretol XR) 100 mg 12 hr tablet Take 100 mg by mouth every evening    [DISCONTINUED] venlafaxine (EFFEXOR-XR) 37 5 mg 24 hr capsule Take 37 5 mg by mouth every morning    [DISCONTINUED] zolpidem (AMBIEN) 10 mg tablet Take 10 mg by mouth every evening     No current facility-administered medications on file prior to visit  She is allergic to iodine       Review of Systems   All other systems reviewed and are negative  Objective:      /64   Ht 5' 5" (1 651 m)   Wt 67 1 kg (148 lb)   BMI 24 63 kg/m²          Physical Exam   Constitutional: She is oriented to person, place, and time  She appears well-developed and well-nourished  Neck: Normal range of motion  Neck supple  Cardiovascular: Normal rate, regular rhythm, normal heart sounds and intact distal pulses  Pulmonary/Chest: Effort normal and breath sounds normal    Abdominal: Soft  Bowel sounds are normal    Musculoskeletal: Normal range of motion  Neurological: She is alert and oriented to person, place, and time  Skin: Skin is warm and dry  Capillary refill takes less than 2 seconds  Psychiatric: She has a normal mood and affect  Her behavior is normal  Judgment and thought content normal    Nursing note and vitals reviewed

## 2019-11-14 ENCOUNTER — HOSPITAL ENCOUNTER (OUTPATIENT)
Dept: MAMMOGRAPHY | Facility: HOSPITAL | Age: 44
Discharge: HOME | End: 2019-11-14
Attending: FAMILY MEDICINE
Payer: COMMERCIAL

## 2019-11-14 VITALS — WEIGHT: 148 LBS | BODY MASS INDEX: 24.66 KG/M2 | HEIGHT: 65 IN

## 2019-11-14 DIAGNOSIS — Z12.39 SCREENING FOR BREAST CANCER: ICD-10-CM

## 2019-11-14 PROCEDURE — 77067 SCR MAMMO BI INCL CAD: CPT

## 2019-11-14 PROCEDURE — 77063 BREAST TOMOSYNTHESIS BI: CPT

## 2020-01-27 ENCOUNTER — TELEPHONE (OUTPATIENT)
Dept: FAMILY MEDICINE CLINIC | Facility: CLINIC | Age: 45
End: 2020-01-27

## 2020-01-27 DIAGNOSIS — K64.9 HEMORRHOIDS, UNSPECIFIED HEMORRHOID TYPE: Primary | ICD-10-CM

## 2020-08-05 DIAGNOSIS — D12.9 SQUAMOUS CELL PAPILLOMA OF ANAL CANAL: Primary | ICD-10-CM

## 2020-08-14 ENCOUNTER — TELEPHONE (OUTPATIENT)
Dept: FAMILY MEDICINE CLINIC | Facility: CLINIC | Age: 45
End: 2020-08-14

## 2020-08-14 ENCOUNTER — TELEMEDICINE (OUTPATIENT)
Dept: FAMILY MEDICINE CLINIC | Facility: CLINIC | Age: 45
End: 2020-08-14
Payer: COMMERCIAL

## 2020-08-14 DIAGNOSIS — K62.89: Primary | ICD-10-CM

## 2020-08-14 DIAGNOSIS — K64.4 ANAL SKIN TAG: ICD-10-CM

## 2020-08-14 PROCEDURE — 1036F TOBACCO NON-USER: CPT | Performed by: FAMILY MEDICINE

## 2020-08-14 PROCEDURE — 99213 OFFICE O/P EST LOW 20 MIN: CPT | Performed by: FAMILY MEDICINE

## 2020-08-14 NOTE — PROGRESS NOTES
Virtual Regular Visit      Assessment/Plan:    Problem List Items Addressed This Visit        Digestive    Anal skin tag    Relevant Orders    Ambulatory referral to General Surgery    Hypertrophy, papillae, anal - Primary     Found on colonoscopy; needs referral to general surgery         Relevant Orders    Ambulatory referral to General Surgery               Reason for visit is   Chief Complaint   Patient presents with    Virtual Regular Visit        Encounter provider Giuliana Wilcox MD    Jeff Tesfaye is to [facility] in [facility] months for annual   She understood, acknowledged and agreed to the plan above  All her questions and concerns were answered and addressed  Case discussed with Dr Kate Epperson    Provider located at [address] [place] [id number]      Recent Visits  Date Type Provider Dept   08/14/20 [address], MD [facility]   [facility]   Showing recent visits within past 7 days and meeting all other requirements     Future Appointments  No visits were found meeting these conditions  Showing future appointments within next 150 days and meeting all other requirements        The patient was identified by name and date of birth  Quinten Robb was informed that this is a telemedicine visit and that the visit is being conducted through [facility] and patient was informed that this is not a secure, HIPAA-complaint platform  She agrees to proceed     My office door was closed  No one else was in the room  She acknowledged consent and understanding of privacy and security of the video platform  The patient has agreed to participate and understands they can discontinue the visit at any time  Patient is aware this is a billable service  Subjective  Quinten Robb is a 40 y o  female was virtually seen over face time   Jeff Tesfaye is a 59-year-old woman that was seen virtually over face time    Review colonoscopy results  She is requesting a general surgery consult as she continues to have pain with defecation as well as bleeding  Past Medical History:   Diagnosis Date    Diabetes mellitus during pregnancy     Herpes labialis     Resolved 6/1/2016        Past Surgical History:   Procedure Laterality Date    TONSILLECTOMY AND ADENOIDECTOMY         Current Outpatient Medications   Medication Sig Dispense Refill    benzonatate (TESSALON PERLES) 100 mg capsule Take 1 capsule (100 mg total) by mouth 3 (three) times a day as needed for cough 20 capsule 0    buPROPion (WELLBUTRIN XL) 300 mg 24 hr tablet Take 1 tablet (300 mg total) by mouth daily 30 tablet 5    carBAMazepine (TEGretol XR) 100 mg 12 hr tablet Take 1 tablet (100 mg total) by mouth every evening 180 tablet 3    Flurandrenolide (CORDRAN) 4 MCG/SQCM TAPE Apply topically      methylphenidate (CONCERTA) 54 MG ER tablet Take 54 mg by mouth daily      Riboflavin 400 MG CAPS Take 1 capsule (400 mg total) by mouth daily 90 capsule 3    selenium sulfide (SELSUN) 2 5 % shampoo Apply topically daily as needed for dandruff 118 mL 5    SUMAtriptan (IMITREX) 100 mg tablet Take 1 tablet (100 mg total) by mouth once as needed for migraine for up to 1 dose 12 tablet 5    triamcinolone (KENALOG) 0 1 % cream Apply topically      valACYclovir (VALTREX) 500 mg tablet take 1 tablet by mouth once daily 30 tablet 5    Zolpidem Tartrate (AMBIEN PO) Take by mouth       No current facility-administered medications for this visit  Allergies   Allergen Reactions    Iodine        Review of Systems   All other systems reviewed and are negative  Video Exam    There were no vitals filed for this visit  Physical Exam  Vitals signs and nursing note reviewed  Pulmonary:      Comments:  In no acute respiratory distress         I spent 20 minutes directly with the patient during this visit      [address] [place] [address] acknowledges that she has consented to an online visit or consultation  She understands that the online visit is based solely on information provided by her, and that, in the absence of a face-to-face physical evaluation by the physician, the diagnosis she receives is both limited and provisional in terms of accuracy and completeness  This is not intended to replace a full medical face-to-face evaluation by the physician  Artis Weber understands and accepts these terms

## 2020-08-16 PROBLEM — K64.4 ANAL SKIN TAG: Status: ACTIVE | Noted: 2020-08-16

## 2020-08-16 PROBLEM — K62.89 HYPERTROPHY, PAPILLAE, ANAL: Status: ACTIVE | Noted: 2020-08-16

## 2020-12-15 DIAGNOSIS — B00.1 COLD SORE: ICD-10-CM

## 2020-12-15 RX ORDER — VALACYCLOVIR HYDROCHLORIDE 500 MG/1
500 TABLET, FILM COATED ORAL DAILY
Qty: 30 TABLET | Refills: 5 | Status: SHIPPED | OUTPATIENT
Start: 2020-12-15 | End: 2021-06-07

## 2021-01-24 DIAGNOSIS — G43.809 OTHER MIGRAINE WITHOUT STATUS MIGRAINOSUS, NOT INTRACTABLE: ICD-10-CM

## 2021-01-25 RX ORDER — SUMATRIPTAN 100 MG/1
100 TABLET, FILM COATED ORAL ONCE AS NEEDED
Qty: 12 TABLET | Refills: 0 | Status: SHIPPED | OUTPATIENT
Start: 2021-01-25 | End: 2021-03-02

## 2021-03-02 DIAGNOSIS — G43.809 OTHER MIGRAINE WITHOUT STATUS MIGRAINOSUS, NOT INTRACTABLE: ICD-10-CM

## 2021-03-02 RX ORDER — SUMATRIPTAN 100 MG/1
TABLET, FILM COATED ORAL
Qty: 12 TABLET | Refills: 0 | Status: SHIPPED | OUTPATIENT
Start: 2021-03-02 | End: 2021-04-08

## 2021-04-08 DIAGNOSIS — G43.809 OTHER MIGRAINE WITHOUT STATUS MIGRAINOSUS, NOT INTRACTABLE: ICD-10-CM

## 2021-04-08 RX ORDER — SUMATRIPTAN 100 MG/1
TABLET, FILM COATED ORAL
Qty: 12 TABLET | Refills: 0 | Status: SHIPPED | OUTPATIENT
Start: 2021-04-08 | End: 2021-05-17

## 2021-05-17 DIAGNOSIS — G43.809 OTHER MIGRAINE WITHOUT STATUS MIGRAINOSUS, NOT INTRACTABLE: ICD-10-CM

## 2021-05-17 RX ORDER — SUMATRIPTAN 100 MG/1
TABLET, FILM COATED ORAL
Qty: 12 TABLET | Refills: 0 | Status: SHIPPED | OUTPATIENT
Start: 2021-05-17 | End: 2021-06-21

## 2021-06-07 DIAGNOSIS — B00.1 COLD SORE: ICD-10-CM

## 2021-06-07 RX ORDER — VALACYCLOVIR HYDROCHLORIDE 500 MG/1
TABLET, FILM COATED ORAL
Qty: 30 TABLET | Refills: 5 | Status: SHIPPED | OUTPATIENT
Start: 2021-06-07 | End: 2022-07-11

## 2021-06-21 DIAGNOSIS — G43.809 OTHER MIGRAINE WITHOUT STATUS MIGRAINOSUS, NOT INTRACTABLE: ICD-10-CM

## 2021-06-21 RX ORDER — SUMATRIPTAN 100 MG/1
TABLET, FILM COATED ORAL
Qty: 12 TABLET | Refills: 0 | Status: SHIPPED | OUTPATIENT
Start: 2021-06-21 | End: 2021-07-28

## 2021-07-28 DIAGNOSIS — G43.809 OTHER MIGRAINE WITHOUT STATUS MIGRAINOSUS, NOT INTRACTABLE: ICD-10-CM

## 2021-07-28 RX ORDER — SUMATRIPTAN 100 MG/1
TABLET, FILM COATED ORAL
Qty: 12 TABLET | Refills: 0 | Status: SHIPPED | OUTPATIENT
Start: 2021-07-28 | End: 2022-07-13 | Stop reason: SDUPTHER

## 2022-01-06 ENCOUNTER — TELEMEDICINE (OUTPATIENT)
Dept: FAMILY MEDICINE CLINIC | Facility: CLINIC | Age: 47
End: 2022-01-06
Payer: COMMERCIAL

## 2022-01-06 VITALS — BODY MASS INDEX: 26.66 KG/M2 | HEIGHT: 65 IN | WEIGHT: 160 LBS

## 2022-01-06 DIAGNOSIS — Z20.822 SUSPECTED COVID-19 VIRUS INFECTION: Primary | ICD-10-CM

## 2022-01-06 PROCEDURE — U0003 INFECTIOUS AGENT DETECTION BY NUCLEIC ACID (DNA OR RNA); SEVERE ACUTE RESPIRATORY SYNDROME CORONAVIRUS 2 (SARS-COV-2) (CORONAVIRUS DISEASE [COVID-19]), AMPLIFIED PROBE TECHNIQUE, MAKING USE OF HIGH THROUGHPUT TECHNOLOGIES AS DESCRIBED BY CMS-2020-01-R: HCPCS | Performed by: FAMILY MEDICINE

## 2022-01-06 PROCEDURE — 99213 OFFICE O/P EST LOW 20 MIN: CPT | Performed by: FAMILY MEDICINE

## 2022-01-06 RX ORDER — CITALOPRAM 40 MG/1
40 TABLET ORAL EVERY MORNING
COMMUNITY
Start: 2021-12-24 | End: 2022-02-18 | Stop reason: ALTCHOICE

## 2022-01-06 RX ORDER — ATOMOXETINE 18 MG/1
18 CAPSULE ORAL EVERY MORNING
COMMUNITY
Start: 2021-12-24

## 2022-01-06 RX ORDER — LAMOTRIGINE 100 MG/1
100 TABLET ORAL EVERY MORNING
COMMUNITY
Start: 2021-12-24

## 2022-01-06 RX ORDER — OMEPRAZOLE 20 MG/1
20 CAPSULE, DELAYED RELEASE ORAL DAILY
COMMUNITY
End: 2022-07-11

## 2022-01-06 RX ORDER — DEXTROAMPHETAMINE SACCHARATE, AMPHETAMINE ASPARTATE, DEXTROAMPHETAMINE SULFATE AND AMPHETAMINE SULFATE 2.5; 2.5; 2.5; 2.5 MG/1; MG/1; MG/1; MG/1
1 TABLET ORAL EVERY MORNING
COMMUNITY
Start: 2021-11-28

## 2022-01-06 NOTE — PROGRESS NOTES
Assessment/Plan:      1  Suspected COVID-19 virus infection  Certainly most c/w Covid 19  Will send test to confirm  Supportive measures discussed  ED precautions given  - Ayde    RTC prn     Patient/Caretaker verbalized understanding and were in agreement with today's assessment and plan  Time was taken to address any questions patient/caretaker had  Chief Complaint   Patient presents with    COVID-19     exposed to covid, c/o migraines, sore throat, body aches, fever       Subjective:      Patient ID: Sly Henson is a 55 y o  female  Patient with symptoms c/w Covid  She has fever, body aches, cough, headaches (has migraine headaches, chronically) that started with h/a on Monday (she thought this was her baseline h/a)  Today, this hit her, significantly  She did not go today due to her significant symptoms  Her younger daughter has similar symptoms  She feels overall, poorly  She is without acute sob, cp  No n/v  She has been vaccinated against Covid but no Booster -- her 2nd vaccine was in march  She is a  -- [employer]  She is the cheerleader , there  The following portions of the patient's history were reviewed and updated as appropriate: allergies, current medications, past family history, past medical history, past social history, past surgical history and problem list     Review of Systems   Constitutional: Positive for activity change, chills, fatigue and fever  HENT: Positive for congestion, sinus pressure, sinus pain and sore throat  Respiratory: Positive for cough  Negative for shortness of breath and wheezing  Cardiovascular: Negative for chest pain and palpitations  Gastrointestinal: Negative for abdominal pain  Musculoskeletal: Positive for myalgias  Neurological: Positive for headaches  Negative for weakness           Objective:      Ht 5' 5" (1 651 m) Comment: from previous visit  Wt 72 6 kg (160 lb) Comment: from previous visit  BMI 26 63 kg/m²          Physical Exam  Constitutional:       General: She is not in acute distress  Appearance: She is ill-appearing  Eyes:      Conjunctiva/sclera: Conjunctivae normal    Pulmonary:      Effort: Pulmonary effort is normal    Neurological:      Mental Status: She is alert  Psychiatric:         Mood and Affect: Mood normal          Behavior: Behavior normal          Thought Content:  Thought content normal          Judgment: Judgment normal

## 2022-01-06 NOTE — Clinical Note
Pt to come to parking lot for test   Let me know when she is here  Jojo Bernardo, DO    No f/u appt to be scheduled at the moment

## 2022-01-09 LAB — SARS-COV-2 RNA RESP QL NAA+PROBE: POSITIVE

## 2022-02-17 ENCOUNTER — HOSPITAL ENCOUNTER (EMERGENCY)
Facility: HOSPITAL | Age: 47
Discharge: HOME | End: 2022-02-17
Attending: EMERGENCY MEDICINE | Admitting: EMERGENCY MEDICINE
Payer: COMMERCIAL

## 2022-02-17 ENCOUNTER — TELEPHONE (OUTPATIENT)
Dept: FAMILY MEDICINE CLINIC | Facility: CLINIC | Age: 47
End: 2022-02-17

## 2022-02-17 VITALS
BODY MASS INDEX: 26.66 KG/M2 | DIASTOLIC BLOOD PRESSURE: 64 MMHG | TEMPERATURE: 99 F | OXYGEN SATURATION: 97 % | HEIGHT: 65 IN | SYSTOLIC BLOOD PRESSURE: 128 MMHG | HEART RATE: 89 BPM | WEIGHT: 160 LBS | RESPIRATION RATE: 16 BRPM

## 2022-02-17 DIAGNOSIS — R51.9 HEADACHE: Primary | ICD-10-CM

## 2022-02-17 LAB
ANION GAP SERPL CALCULATED.3IONS-SCNC: 8 MMOL/L (ref 4–13)
BASOPHILS # BLD AUTO: 0.04 THOUSANDS/ΜL (ref 0–0.1)
BASOPHILS NFR BLD AUTO: 0 % (ref 0–1)
BILIRUB UR QL STRIP: NEGATIVE
BUN SERPL-MCNC: 15 MG/DL (ref 5–25)
CALCIUM SERPL-MCNC: 8.6 MG/DL (ref 8.3–10.1)
CHLORIDE SERPL-SCNC: 104 MMOL/L (ref 100–108)
CLARITY UR: CLEAR
CO2 SERPL-SCNC: 24 MMOL/L (ref 21–32)
COLOR UR: YELLOW
CREAT SERPL-MCNC: 0.81 MG/DL (ref 0.6–1.3)
EOSINOPHIL # BLD AUTO: 0.13 THOUSAND/ΜL (ref 0–0.61)
EOSINOPHIL NFR BLD AUTO: 1 % (ref 0–6)
ERYTHROCYTE [DISTWIDTH] IN BLOOD BY AUTOMATED COUNT: 13.7 % (ref 11.6–15.1)
EXT PREG TEST URINE: NEGATIVE
EXT. CONTROL ED NAV: NORMAL
FLUAV RNA RESP QL NAA+PROBE: NEGATIVE
FLUBV RNA RESP QL NAA+PROBE: NEGATIVE
GFR SERPL CREATININE-BSD FRML MDRD: 87 ML/MIN/1.73SQ M
GLUCOSE SERPL-MCNC: 105 MG/DL (ref 65–140)
GLUCOSE UR STRIP-MCNC: NEGATIVE MG/DL
HCT VFR BLD AUTO: 39.5 % (ref 34.8–46.1)
HGB BLD-MCNC: 13.3 G/DL (ref 11.5–15.4)
HGB UR QL STRIP.AUTO: NEGATIVE
IMM GRANULOCYTES # BLD AUTO: 0.04 THOUSAND/UL (ref 0–0.2)
IMM GRANULOCYTES NFR BLD AUTO: 0 % (ref 0–2)
KETONES UR STRIP-MCNC: NEGATIVE MG/DL
LEUKOCYTE ESTERASE UR QL STRIP: NEGATIVE
LYMPHOCYTES # BLD AUTO: 1.41 THOUSANDS/ΜL (ref 0.6–4.47)
LYMPHOCYTES NFR BLD AUTO: 10 % (ref 14–44)
MCH RBC QN AUTO: 30.5 PG (ref 26.8–34.3)
MCHC RBC AUTO-ENTMCNC: 33.7 G/DL (ref 31.4–37.4)
MCV RBC AUTO: 91 FL (ref 82–98)
MONOCYTES # BLD AUTO: 1.07 THOUSAND/ΜL (ref 0.17–1.22)
MONOCYTES NFR BLD AUTO: 8 % (ref 4–12)
NEUTROPHILS # BLD AUTO: 10.84 THOUSANDS/ΜL (ref 1.85–7.62)
NEUTS SEG NFR BLD AUTO: 81 % (ref 43–75)
NITRITE UR QL STRIP: NEGATIVE
NRBC BLD AUTO-RTO: 0 /100 WBCS
PH UR STRIP.AUTO: 6 [PH]
PLATELET # BLD AUTO: 310 THOUSANDS/UL (ref 149–390)
PMV BLD AUTO: 10.7 FL (ref 8.9–12.7)
POTASSIUM SERPL-SCNC: 4 MMOL/L (ref 3.5–5.3)
PROT UR STRIP-MCNC: NEGATIVE MG/DL
RBC # BLD AUTO: 4.36 MILLION/UL (ref 3.81–5.12)
RSV RNA RESP QL NAA+PROBE: NEGATIVE
SARS-COV-2 RNA RESP QL NAA+PROBE: POSITIVE
SODIUM SERPL-SCNC: 136 MMOL/L (ref 136–145)
SP GR UR STRIP.AUTO: 1.02 (ref 1–1.03)
UROBILINOGEN UR QL STRIP.AUTO: 0.2 E.U./DL
WBC # BLD AUTO: 13.53 THOUSAND/UL (ref 4.31–10.16)

## 2022-02-17 PROCEDURE — 81025 URINE PREGNANCY TEST: CPT | Performed by: EMERGENCY MEDICINE

## 2022-02-17 PROCEDURE — 96374 THER/PROPH/DIAG INJ IV PUSH: CPT

## 2022-02-17 PROCEDURE — 99284 EMERGENCY DEPT VISIT MOD MDM: CPT | Performed by: EMERGENCY MEDICINE

## 2022-02-17 PROCEDURE — 80048 BASIC METABOLIC PNL TOTAL CA: CPT | Performed by: EMERGENCY MEDICINE

## 2022-02-17 PROCEDURE — 96361 HYDRATE IV INFUSION ADD-ON: CPT

## 2022-02-17 PROCEDURE — 36415 COLL VENOUS BLD VENIPUNCTURE: CPT | Performed by: EMERGENCY MEDICINE

## 2022-02-17 PROCEDURE — 81003 URINALYSIS AUTO W/O SCOPE: CPT | Performed by: EMERGENCY MEDICINE

## 2022-02-17 PROCEDURE — 85025 COMPLETE CBC W/AUTO DIFF WBC: CPT | Performed by: EMERGENCY MEDICINE

## 2022-02-17 PROCEDURE — 96375 TX/PRO/DX INJ NEW DRUG ADDON: CPT

## 2022-02-17 PROCEDURE — 0241U HB NFCT DS VIR RESP RNA 4 TRGT: CPT | Performed by: EMERGENCY MEDICINE

## 2022-02-17 PROCEDURE — 99283 EMERGENCY DEPT VISIT LOW MDM: CPT

## 2022-02-17 RX ORDER — METOCLOPRAMIDE HYDROCHLORIDE 5 MG/ML
10 INJECTION INTRAMUSCULAR; INTRAVENOUS ONCE
Status: COMPLETED | OUTPATIENT
Start: 2022-02-17 | End: 2022-02-17

## 2022-02-17 RX ORDER — DIPHENHYDRAMINE HYDROCHLORIDE 50 MG/ML
25 INJECTION INTRAMUSCULAR; INTRAVENOUS ONCE
Status: COMPLETED | OUTPATIENT
Start: 2022-02-17 | End: 2022-02-17

## 2022-02-17 RX ORDER — ACETAMINOPHEN 325 MG/1
975 TABLET ORAL ONCE
Status: COMPLETED | OUTPATIENT
Start: 2022-02-17 | End: 2022-02-17

## 2022-02-17 RX ORDER — KETOROLAC TROMETHAMINE 30 MG/ML
30 INJECTION, SOLUTION INTRAMUSCULAR; INTRAVENOUS ONCE
Status: COMPLETED | OUTPATIENT
Start: 2022-02-17 | End: 2022-02-17

## 2022-02-17 RX ADMIN — SODIUM CHLORIDE 1000 ML: 0.9 INJECTION, SOLUTION INTRAVENOUS at 12:29

## 2022-02-17 RX ADMIN — ACETAMINOPHEN 975 MG: 325 TABLET, FILM COATED ORAL at 12:29

## 2022-02-17 RX ADMIN — METOCLOPRAMIDE HYDROCHLORIDE 10 MG: 5 INJECTION INTRAMUSCULAR; INTRAVENOUS at 12:29

## 2022-02-17 RX ADMIN — DIPHENHYDRAMINE HYDROCHLORIDE 25 MG: 50 INJECTION, SOLUTION INTRAMUSCULAR; INTRAVENOUS at 12:29

## 2022-02-17 RX ADMIN — KETOROLAC TROMETHAMINE 30 MG: 30 INJECTION, SOLUTION INTRAMUSCULAR at 14:01

## 2022-02-17 NOTE — Clinical Note
Ginette Crowe was seen and treated in our emergency department on 2/17/2022  Diagnosis:     Juvencio    She may return on this date: 02/18/2022         If you have any questions or concerns, please don't hesitate to call        Kesha Ren DO    ______________________________           _______________          _______________  Hospital Representative                              Date                                Time

## 2022-02-17 NOTE — ED PROVIDER NOTES
History  Chief Complaint   Patient presents with    Headache - Recurrent or Known Dx Migraines     with nausea, vomitting    Arm Pain     pt reports left arm pain worse when lifting arm up       History provided by:  Patient  Headache - Recurrent or Known Dx Migraines  Pain location:  Occipital and L parietal  Quality:  Dull  Radiates to:  L arm  Onset quality:  Gradual  Duration:  5 days  Timing:  Constant  Progression:  Waxing and waning  Chronicity:  New  Similar to prior headaches: no    Context: not activity, not exposure to bright light, not caffeine, not coughing, not stress, not intercourse, not loud noise and not straining    Relieved by:  Nothing  Worsened by:  Neck movement  Ineffective treatments:  NSAIDs  Associated symptoms: congestion, fatigue, myalgias, nausea, neck pain and URI    Associated symptoms: no abdominal pain, no back pain, no cough, no diarrhea, no ear pain, no eye pain, no fever, no focal weakness, no near-syncope, no neck stiffness, no numbness, no paresthesias, no seizures, no sinus pressure, no sore throat, no syncope, no tingling, no vomiting and no weakness    Risk factors: no family hx of SAH and lifestyle not sedentary    Arm Pain  Associated symptoms: congestion, fatigue, myalgias and nausea    Associated symptoms: no abdominal pain, no chest pain, no cough, no diarrhea, no ear pain, no fever, no rash, no shortness of breath, no sore throat and no vomiting        Prior to Admission Medications   Prescriptions Last Dose Informant Patient Reported? Taking?    Flurandrenolide (CORDRAN) 4 MCG/SQCM TAPE   Yes No   Sig: Apply topically   Riboflavin 400 MG CAPS   No No   Sig: Take 1 capsule (400 mg total) by mouth daily   SUMAtriptan (IMITREX) 100 mg tablet   No No   Sig: take 1 tablet by mouth ONCE if needed for migraines for up to 1 dose   Strattera 18 MG capsule   Yes No   Sig: Take 18 mg by mouth every morning   Zolpidem Tartrate (AMBIEN PO)   Yes No   Sig: Take by mouth amphetamine-dextroamphetamine (ADDERALL) 10 mg tablet   Yes No   Sig: Take 1 tablet by mouth every morning   buPROPion (WELLBUTRIN XL) 300 mg 24 hr tablet   No No   Sig: Take 1 tablet (300 mg total) by mouth daily   carBAMazepine (TEGretol XR) 100 mg 12 hr tablet   No No   Sig: Take 1 tablet (100 mg total) by mouth every evening   citalopram (CeleXA) 40 mg tablet   Yes No   Sig: Take 40 mg by mouth every morning   lamoTRIgine (LaMICtal) 100 mg tablet   Yes No   Sig: Take 100 mg by mouth every morning   methylphenidate (CONCERTA) 54 MG ER tablet   Yes No   Sig: Take 54 mg by mouth daily   omeprazole (PriLOSEC) 20 mg delayed release capsule   Yes No   Sig: Take 20 mg by mouth daily   selenium sulfide (SELSUN) 2 5 % shampoo   No No   Sig: Apply topically daily as needed for dandruff   triamcinolone (KENALOG) 0 1 % cream   Yes No   Sig: Apply topically   valACYclovir (VALTREX) 500 mg tablet   No No   Sig: take 1 tablet by mouth once daily      Facility-Administered Medications: None       Past Medical History:   Diagnosis Date    Diabetes mellitus during pregnancy     Herpes labialis     Resolved 6/1/2016        Past Surgical History:   Procedure Laterality Date    TONSILLECTOMY AND ADENOIDECTOMY         Family History   Problem Relation Age of Onset    Anxiety disorder Mother         NOS    Depression Mother     Diabetes Father     Diabetes Brother     Chromosomal disorder Son     No Known Problems Sister     No Known Problems Daughter     No Known Problems Maternal Grandmother     No Known Problems Paternal Grandmother     No Known Problems Sister     No Known Problems Daughter     No Known Problems Daughter     No Known Problems Maternal Aunt     No Known Problems Paternal Aunt     No Known Problems Paternal Aunt     No Known Problems Paternal Aunt     No Known Problems Paternal Aunt     No Known Problems Paternal Aunt     No Known Problems Paternal Aunt     No Known Problems Paternal Aunt  No Known Problems Paternal Aunt     No Known Problems Paternal Aunt     No Known Problems Paternal Aunt     No Known Problems Paternal Aunt     No Known Problems Paternal Aunt     No Known Problems Paternal Aunt     No Known Problems Paternal Aunt      I have reviewed and agree with the history as documented  E-Cigarette/Vaping     E-Cigarette/Vaping Substances     Social History     Tobacco Use    Smoking status: Never Smoker    Smokeless tobacco: Never Used   Substance Use Topics    Alcohol use: Not on file    Drug use: Not on file       Review of Systems   Constitutional: Positive for fatigue  Negative for chills and fever  HENT: Positive for congestion  Negative for ear pain, sinus pressure and sore throat  Eyes: Negative for pain and visual disturbance  Respiratory: Negative for cough and shortness of breath  Cardiovascular: Negative for chest pain, palpitations, syncope and near-syncope  Gastrointestinal: Positive for nausea  Negative for abdominal pain, diarrhea and vomiting  Genitourinary: Negative for difficulty urinating, dysuria and hematuria  Musculoskeletal: Positive for myalgias and neck pain  Negative for arthralgias, back pain and neck stiffness  Left arm pain   Skin: Negative for color change and rash  Neurological: Negative for focal weakness, seizures, syncope, weakness, numbness and paresthesias  All other systems reviewed and are negative  Physical Exam  Physical Exam  Vitals and nursing note reviewed  Exam conducted with a chaperone present  Constitutional:       General: She is not in acute distress  Appearance: Normal appearance  She is normal weight  She is not toxic-appearing or diaphoretic  HENT:      Head: Normocephalic and atraumatic  Right Ear: External ear normal       Left Ear: External ear normal       Nose: Nose normal       Mouth/Throat:      Mouth: Mucous membranes are moist       Pharynx: Oropharynx is clear     Eyes: General: No visual field deficit or scleral icterus  Extraocular Movements: Extraocular movements intact  Conjunctiva/sclera: Conjunctivae normal    Cardiovascular:      Rate and Rhythm: Normal rate and regular rhythm  Pulses: Normal pulses  Radial pulses are 2+ on the right side and 2+ on the left side  Heart sounds: Normal heart sounds  No murmur heard  Gallop: ulnar 2+ Left  Pulmonary:      Effort: Pulmonary effort is normal  No respiratory distress  Abdominal:      General: Abdomen is flat  There is no distension  Musculoskeletal:         General: No swelling or tenderness  Cervical back: Normal range of motion and neck supple  No rigidity or tenderness  Lymphadenopathy:      Cervical: No cervical adenopathy  Neurological:      Mental Status: She is alert and oriented to person, place, and time  Mental status is at baseline  GCS: GCS eye subscore is 4  GCS verbal subscore is 5  GCS motor subscore is 6  Cranial Nerves: Cranial nerves are intact  No dysarthria or facial asymmetry  Sensory: Sensation is intact  No sensory deficit  Motor: Motor function is intact  No weakness or abnormal muscle tone  Coordination: Coordination is intact  Gait: Gait is intact        Deep Tendon Reflexes: Reflexes normal          Vital Signs  ED Triage Vitals   Temperature Pulse Respirations Blood Pressure SpO2   02/17/22 1200 02/17/22 1157 02/17/22 1157 02/17/22 1157 02/17/22 1157   99 °F (37 2 °C) 78 18 151/79 98 %      Temp Source Heart Rate Source Patient Position - Orthostatic VS BP Location FiO2 (%)   02/17/22 1200 02/17/22 1157 02/17/22 1157 02/17/22 1157 --   Temporal Monitor Sitting Right arm       Pain Score       02/17/22 1157       6           Vitals:    02/17/22 1157 02/17/22 1300 02/17/22 1330   BP: 151/79 115/57 128/64   Pulse: 78 95 89   Patient Position - Orthostatic VS: Sitting Sitting          Visual Acuity      ED Medications  Medications   sodium chloride 0 9 % bolus 1,000 mL (0 mL Intravenous Stopped 2/17/22 1329)   acetaminophen (TYLENOL) tablet 975 mg (975 mg Oral Given 2/17/22 1229)   metoclopramide (REGLAN) injection 10 mg (10 mg Intravenous Given 2/17/22 1229)   diphenhydrAMINE (BENADRYL) injection 25 mg (25 mg Intravenous Given 2/17/22 1229)   ketorolac (TORADOL) injection 30 mg (30 mg Intravenous Given 2/17/22 1401)       Diagnostic Studies  Results Reviewed     Procedure Component Value Units Date/Time    COVID/FLU/RSV - 2 hour TAT [[id number]]  (Abnormal) Collected: 02/17/22 1229    Lab Status: Final result Specimen: Nasopharyngeal Swab Updated: 02/17/22 1322     SARS-CoV-2 Positive     INFLUENZA A PCR Negative     INFLUENZA B PCR Negative     RSV PCR Negative    Narrative:      FOR PEDIATRIC PATIENTS - copy/paste COVID Guidelines URL to browser: [email or web address]  [employer]    SARS-CoV-2 assay is a Nucleic Acid Amplification assay intended for the  qualitative detection of nucleic acid from SARS-CoV-2 in nasopharyngeal  swabs  Results are for the presumptive identification of SARS-CoV-2 RNA  Positive results are indicative of infection with SARS-CoV-2, the virus  causing COVID-19, but do not rule out bacterial infection or co-infection  with other viruses  Laboratories within the [place] and its  territories are required to report all positive results to the appropriate  public health authorities  Negative results do not preclude SARS-CoV-2  infection and should not be used as the sole basis for treatment or other  patient management decisions  Negative results must be combined with  clinical observations, patient history, and epidemiological information  This test has not been FDA cleared or approved  This test has been authorized by FDA under an Emergency Use Authorization  (EUA)   This test is only authorized for the duration of time the  declaration that circumstances exist justifying the authorization of the  emergency use of an in vitro diagnostic tests for detection of SARS-CoV-2  virus and/or diagnosis of COVID-19 infection under section 564(b)(1) of  the Act, 21 U  S C  595EJX-9(E)(2), unless the authorization is terminated  or revoked sooner  The test has been validated but independent review by FDA  and CLIA is pending  Test performed using [employer] GeneXpert: This RT-PCR assay targets N2,  a region unique to SARS-CoV-2  A conserved region in the E-gene was chosen  for pan-Sarbecovirus detection which includes SARS-CoV-2      Basic metabolic panel [[id number]] Collected: 02/17/22 1229    Lab Status: Final result Specimen: Blood from Arm, Right Updated: 02/17/22 1247     Sodium 136 mmol/L      Potassium 4 0 mmol/L      Chloride 104 mmol/L      CO2 24 mmol/L      ANION GAP 8 mmol/L      BUN 15 mg/dL      Creatinine 0 81 mg/dL      Glucose 105 mg/dL      Calcium 8 6 mg/dL      eGFR 87 ml/min/1 73sq m     Narrative:      [facility] guidelines for Chronic Kidney Disease (CKD):     Stage 1 with normal or high GFR (GFR > 90 mL/min/1 73 square meters)    Stage 2 Mild CKD (GFR = 60-89 mL/min/1 73 square meters)    Stage 3A Moderate CKD (GFR = 45-59 mL/min/1 73 square meters)    Stage 3B Moderate CKD (GFR = 30-44 mL/min/1 73 square meters)    Stage 4 Severe CKD (GFR = 15-29 mL/min/1 73 square meters)    Stage 5 End Stage CKD (GFR <15 mL/min/1 73 square meters)  Note: GFR calculation is accurate only with a steady state creatinine    UA w Reflex to Microscopic w Reflex to Culture [[id number]] Collected: 02/17/22 1228    Lab Status: Final result Specimen: Urine, Clean Catch Updated: 02/17/22 1245     Color, UA Yellow     Clarity, UA Clear     Specific [place], UA 1 020     pH, UA 6 0     Leukocytes, UA Negative     Nitrite, UA Negative     Protein, UA Negative mg/dl      Glucose, UA Negative mg/dl      Ketones, UA Negative mg/dl Urobilinogen, UA 0 2 E U /dl      Bilirubin, UA Negative     Blood, UA Negative    CBC and differential [[id number]]  (Abnormal) Collected: 02/17/22 1229    Lab Status: Final result Specimen: Blood from Arm, Right Updated: 02/17/22 1244     WBC 13 53 Thousand/uL      RBC 4 36 Million/uL      Hemoglobin 13 3 g/dL      Hematocrit 39 5 %      MCV 91 fL      MCH 30 5 pg      MCHC 33 7 g/dL      RDW 13 7 %      MPV 10 7 fL      Platelets 133 Thousands/uL      nRBC 0 /100 WBCs      Neutrophils Relative 81 %      Immat GRANS % 0 %      Lymphocytes Relative 10 %      Monocytes Relative 8 %      Eosinophils Relative 1 %      Basophils Relative 0 %      Neutrophils Absolute 10 84 Thousands/µL      Immature Grans Absolute 0 04 Thousand/uL      Lymphocytes Absolute 1 41 Thousands/µL      Monocytes Absolute 1 07 Thousand/µL      Eosinophils Absolute 0 13 Thousand/µL      Basophils Absolute 0 04 Thousands/µL     POCT pregnancy, urine [[id number]]  (Normal) Resulted: 02/17/22 1230    Lab Status: Final result Updated: 02/17/22 1230     EXT PREG TEST UR (Ref: Negative) negative     Control valid                 No orders to display              Procedures  Procedures         ED Course  ED Course as of 02/17/22 1404   Thu Feb 17, 2022   1335 SARS-COV-2(!): Positive  Known COVID infection, testing was for influenza   1402 Patient reassessed at this time she reports significant improvement in her discomfort no more pressure or radicular symptoms  Feels improved like to leave at this time  She has not yet received Toradol issues does have some lingering symptoms she is willing to accept the Toradol prior to discharge  Will provide work note  Return precautions given  Patient with normal neuro exam reassessment  SBIRT 20yo+      Most Recent Value   SBIRT (24 yo +)    In order to provide better care to our patients, we are screening all of our patients for alcohol and drug use   Would it be okay to ask you these screening questions? Yes Filed at: 02/17/2022 1330   Initial Alcohol Screen: US AUDIT-C     1  How often do you have a drink containing alcohol? 0 Filed at: 02/17/2022 1330   2  How many drinks containing alcohol do you have on a typical day you are drinking? 0 Filed at: 02/17/2022 1330   3a  Male UNDER 65: How often do you have five or more drinks on one occasion? 0 Filed at: 02/17/2022 1330   3b  FEMALE Any Age, or MALE 65+: How often do you have 4 or more drinks on one occassion? 0 Filed at: 02/17/2022 1330   Audit-C Score 0 Filed at: 02/17/2022 1330   CHARISSA: How many times in the past year have you    Used an illegal drug or used a prescription medication for non-medical reasons? Never Filed at: 02/17/2022 1330                    MDM  Number of Diagnoses or Management Options  Headache: new and requires workup     Amount and/or Complexity of Data Reviewed  Clinical lab tests: ordered and reviewed  Tests in the medicine section of CPT®: ordered and reviewed  Decide to obtain previous medical records or to obtain history from someone other than the patient: yes  Review and summarize past medical records: yes  Independent visualization of images, tracings, or specimens: yes    Risk of Complications, Morbidity, and/or Mortality  Presenting problems: moderate  Diagnostic procedures: moderate  Management options: moderate    56 yo F, headache x 5 days, hx of migraines, different as no photo/phonophobia  Unclear if atypical migraine or headache in context of viral syndrome/uri/recent covid infection  C/o L arm pain as well, unremarkable n/v intact exam of LUE  No obvious lymphadenopathy  Seems peripheral n distribution vs myalgias  w/ neck pain could be cervical radiculopathy  No hx of trauma, bleeding, prior surgery  Will try migraine cocktail and reassess, check basic labs given possible viral syndrome, N/V, fatigue   No imaging at this time as no trauma, normal physical exam  Less likely meningitis no neck rigidity, fever, midline symptoms  No frontal headache, normal EOM, normal pupils, doubt venous thrombosis  Disposition  Final diagnoses:   Headache     Time reflects when diagnosis was documented in both MDM as applicable and the Disposition within this note     Time User Action Codes Description Comment    2/17/2022 12:12 PM Ritu Dove Add [R51 9] Headache       ED Disposition     ED Disposition Condition Date/Time Comment    Discharge Stable Thu Feb 17, 2022  2:03 PM Quinten Robb discharge to home/self care  Follow-up Information     Follow up With Specialties Details Why Contact Info Additional Information    Jojo Josue DO Family Medicine Schedule an appointment as soon as possible for a visit   Osmel Davila [address] [facility] Emergency Department Emergency Medicine Go to  If symptoms worsen [facility] 24 [phone]  [address] [place] [address] Emergency Department[address], [address]          Patient's Medications   Discharge Prescriptions    No medications on file       No discharge procedures on file      PDMP Review     None          ED Provider  Electronically Signed by           Efe Viera DO  02/17/22 2038

## 2022-02-17 NOTE — TELEPHONE ENCOUNTER
Pt called, states she is having severe head pain, took migraine medication with no relief, is also having severre body aches and vomiting  Pt stated she is having pain with any movement of head  Advised pt to er as directed, pt agreeable

## 2022-02-18 ENCOUNTER — TELEMEDICINE (OUTPATIENT)
Dept: FAMILY MEDICINE CLINIC | Facility: CLINIC | Age: 47
End: 2022-02-18
Payer: COMMERCIAL

## 2022-02-18 VITALS — HEIGHT: 65 IN | BODY MASS INDEX: 26.66 KG/M2 | WEIGHT: 160 LBS

## 2022-02-18 DIAGNOSIS — U07.1 COVID-19: ICD-10-CM

## 2022-02-18 DIAGNOSIS — M79.10 MYALGIA: ICD-10-CM

## 2022-02-18 DIAGNOSIS — R51.9 SINUS HEADACHE: ICD-10-CM

## 2022-02-18 DIAGNOSIS — R11.2 NON-INTRACTABLE VOMITING WITH NAUSEA, UNSPECIFIED VOMITING TYPE: ICD-10-CM

## 2022-02-18 DIAGNOSIS — J01.00 ACUTE NON-RECURRENT MAXILLARY SINUSITIS: Primary | ICD-10-CM

## 2022-02-18 PROCEDURE — 99214 OFFICE O/P EST MOD 30 MIN: CPT | Performed by: FAMILY MEDICINE

## 2022-02-18 PROCEDURE — 1036F TOBACCO NON-USER: CPT | Performed by: FAMILY MEDICINE

## 2022-02-18 PROCEDURE — 3008F BODY MASS INDEX DOCD: CPT | Performed by: FAMILY MEDICINE

## 2022-02-18 RX ORDER — PREDNISONE 20 MG/1
40 TABLET ORAL DAILY
Qty: 10 TABLET | Refills: 0 | Status: SHIPPED | OUTPATIENT
Start: 2022-02-18 | End: 2022-02-23

## 2022-02-18 RX ORDER — DOXYCYCLINE HYCLATE 100 MG/1
100 CAPSULE ORAL EVERY 12 HOURS SCHEDULED
Qty: 14 CAPSULE | Refills: 0 | Status: SHIPPED | OUTPATIENT
Start: 2022-02-18 | End: 2022-02-25

## 2022-02-18 RX ORDER — ZOLPIDEM TARTRATE 10 MG/1
10 TABLET ORAL EVERY EVENING
COMMUNITY
Start: 2022-01-31

## 2022-02-18 NOTE — PROGRESS NOTES
Virtual Regular Visit    Verification of patient location:    Patient is located in the following state in which I hold an active license [place]      Assessment/Plan:    1  Acute non-recurrent maxillary sinusitis  - will tx with doxycycline  Already has belly upset/n/d, will avoid Augmentin 2/2 such  Will also do prednisone X 5 days for congestion and head pain/discomfort  She is on board  To stay well rested and hydrated in the meantime     - doxycycline hyclate (VIBRAMYCIN) 100 mg capsule; Take 1 capsule (100 mg total) by mouth every 12 (twelve) hours for 7 days  Dispense: 14 capsule; Refill: 0  - predniSONE 20 mg tablet; Take 2 tablets (40 mg total) by mouth daily for 5 days  Dispense: 10 tablet; Refill: 0    2  Myalgia  - predniSONE 20 mg tablet; Take 2 tablets (40 mg total) by mouth daily for 5 days  Dispense: 10 tablet; Refill: 0    3  COVID-19  - had covid in the beginning of Jan   Did well after for a period of time  4  Sinus headache    5  Non-intractable vomiting with nausea, unspecified vomiting type    RTC prn pending above  Patient/Caretaker verbalized understanding and were in agreement with today's assessment and plan  Time was taken to address any questions patient/caretaker had  Indication/Risks/Benefits of medication(s) as prescribed were discussed with the patient/caretaker  The patient verbalized understanding and agreement and elects to take medications as prescribed  Time was taken to answer any questions the patient/caretaker may have had          Reason for visit is   Chief Complaint   Patient presents with   31 60 98     covid f/u questions - was in ER yesterday        Encounter provider Jojo Comer DO    Provider located at [address] [place] [id number]      Recent Visits  Date Type Provider Dept   02/17/22 Telephone Cassie [address] recent visits within past 7 days and meeting all other requirements  Today's Visits  Date Type Provider Dept   02/18/22 Telemedicine DO Delta Le   Showing today's visits and meeting all other requirements  Future Appointments  No visits were found meeting these conditions  Showing future appointments within next 150 days and meeting all other requirements       The patient was identified by name and date of birth  Yuliet Queen was informed that this is a telemedicine visit and that the visit is being conducted through [address] [place] [address] Now and patient was informed that this is a secure, HIPAA-compliant platform  She agrees to proceed     My office door was closed  No one else was in the room  She acknowledged consent and understanding of privacy and security of the video platform  The patient has agreed to participate and understands they can discontinue the visit at any time  Patient is aware this is a billable service  Subjective  Yuliet Queen is a 55 y o  female with Covid dx'd on 1/6/22  She is with onset of head congestion, fever with Tm of 102 7 degrees, fatigue, myalgia and n/v/d Since Wed  She has been Covid vaccinated X 1  She had Covid in the beginning of January  She was at work today, went to ED with her symptoms on day prior to visit and was told it was "ok" to do so  She is still not feeling well today, hence is here for a visit, virtually  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 [employer]          Past Medical History:   Diagnosis Date    Diabetes mellitus during pregnancy     Herpes labialis     Resolved 6/1/2016        Past Surgical History:   Procedure Laterality Date    TONSILLECTOMY AND ADENOIDECTOMY         Current Outpatient Medications   Medication Sig Dispense Refill    amphetamine-dextroamphetamine (ADDERALL) 10 mg tablet Take 1 tablet by mouth every morning      buPROPion (WELLBUTRIN XL) 300 mg 24 hr tablet Take 1 tablet (300 mg total) by mouth daily 30 tablet 5    carBAMazepine (TEGretol XR) 100 mg 12 hr tablet Take 1 tablet (100 mg total) by mouth every evening 180 tablet 3    Flurandrenolide (CORDRAN) 4 MCG/SQCM TAPE Apply topically      lamoTRIgine (LaMICtal) 100 mg tablet Take 100 mg by mouth every morning      omeprazole (PriLOSEC) 20 mg delayed release capsule Take 20 mg by mouth daily      Riboflavin 400 MG CAPS Take 1 capsule (400 mg total) by mouth daily 90 capsule 3    selenium sulfide (SELSUN) 2 5 % shampoo Apply topically daily as needed for dandruff 118 mL 5    Strattera 18 MG capsule Take 18 mg by mouth every morning      SUMAtriptan (IMITREX) 100 mg tablet take 1 tablet by mouth ONCE if needed for migraines for up to 1 dose 12 tablet 0    triamcinolone (KENALOG) 0 1 % cream Apply topically      valACYclovir (VALTREX) 500 mg tablet take 1 tablet by mouth once daily 30 tablet 5    zolpidem (AMBIEN) 10 mg tablet Take 10 mg by mouth every evening      Zolpidem Tartrate (AMBIEN PO) Take by mouth       No current facility-administered medications for this visit  Allergies   Allergen Reactions    Iodine - Food Allergy Other (See Comments)     unknown       Review of Systems   All other systems reviewed and are negative  Video Exam    Vitals:    02/18/22 1500   Weight: 72 6 kg (160 lb)   Height: 5' 5" (1 651 m)       Physical Exam  Constitutional:       Comments: Tired appearing     HENT:      Head: Normocephalic and atraumatic  Nose:      Comments: B/l maxillary sinus ttp    Eyes:      Conjunctiva/sclera: Conjunctivae normal    Pulmonary:      Effort: Pulmonary effort is normal    Neurological:      General: No focal deficit present  Mental Status: She is alert and oriented to person, place, and time  Psychiatric:         Mood and Affect: Mood normal          Behavior: Behavior normal          Thought Content:  Thought content normal          Judgment: Judgment normal           I spent 20 minutes directly with the patient during this visit    VIRTUAL VISIT DISCLAIMER      Pérez Hernandez verbally agrees to participate in [employer]  Pt is aware that [employer] could be limited without vital signs or the ability to perform a full hands-on physical exam  Juvencio Martin understands she or the provider may request at any time to terminate the video visit and request the patient to seek care or treatment in person

## 2022-02-21 ENCOUNTER — TELEPHONE (OUTPATIENT)
Dept: ADMINISTRATIVE | Facility: OTHER | Age: 47
End: 2022-02-21

## 2022-02-21 NOTE — TELEPHONE ENCOUNTER
Upon review of the In Basket request we were able to locate, review, and update the patient chart as requested for Pap Smear (HPV) aka Cervical Cancer Screening  Any additional questions or concerns should be emailed to the Practice Liaisons via [email or web address] email, please do not reply via In Basket      Thank you  Yayo Santiago MA

## 2022-02-21 NOTE — TELEPHONE ENCOUNTER
----- Message from Viviana Moore MA sent at 2/18/2022  3:04 PM EST -----  Regarding: care gap request  02/18/22 3:04 PM    Hello, our patient attached above has had Pap Smear (HPV) aka Cervical Cancer Screening completed/performed  Please assist in updating the patient chart by pulling the Care Everywhere (CE) document  The date of service is 03/02/2021       Thank you,  Viviana Moore MA  [facility]

## 2022-06-29 ENCOUNTER — OFFICE VISIT (OUTPATIENT)
Dept: URGENT CARE | Facility: CLINIC | Age: 47
End: 2022-06-29
Payer: COMMERCIAL

## 2022-06-29 ENCOUNTER — APPOINTMENT (OUTPATIENT)
Dept: RADIOLOGY | Facility: CLINIC | Age: 47
End: 2022-06-29
Payer: COMMERCIAL

## 2022-06-29 VITALS
DIASTOLIC BLOOD PRESSURE: 76 MMHG | OXYGEN SATURATION: 98 % | BODY MASS INDEX: 29.49 KG/M2 | TEMPERATURE: 98 F | HEIGHT: 65 IN | RESPIRATION RATE: 20 BRPM | WEIGHT: 177 LBS | SYSTOLIC BLOOD PRESSURE: 150 MMHG | HEART RATE: 100 BPM

## 2022-06-29 DIAGNOSIS — S90.02XA CONTUSION OF LEFT ANKLE, INITIAL ENCOUNTER: ICD-10-CM

## 2022-06-29 DIAGNOSIS — S93.402A SPRAIN OF LEFT ANKLE, UNSPECIFIED LIGAMENT, INITIAL ENCOUNTER: ICD-10-CM

## 2022-06-29 DIAGNOSIS — S90.02XA CONTUSION OF LEFT ANKLE, INITIAL ENCOUNTER: Primary | ICD-10-CM

## 2022-06-29 PROCEDURE — 73610 X-RAY EXAM OF ANKLE: CPT

## 2022-06-29 PROCEDURE — 99203 OFFICE O/P NEW LOW 30 MIN: CPT | Performed by: PHYSICIAN ASSISTANT

## 2022-06-29 RX ORDER — PREDNISONE 10 MG/1
TABLET ORAL
Qty: 20 TABLET | Refills: 0 | Status: SHIPPED | OUTPATIENT
Start: 2022-06-29 | End: 2022-07-11 | Stop reason: ALTCHOICE

## 2022-06-29 NOTE — PROGRESS NOTES
[facility]        NAME: Darryle Aldrich is a 55 y o  female  : 1975    MRN: [id number]  DATE: 2022  TIME: 5:28 PM    Assessment and Plan   Contusion of left ankle, initial encounter [S90 [id number]]  1  Contusion of left ankle, initial encounter  XR ankle 3+ vw left    predniSONE 10 mg tablet   2  Sprain of left ankle, unspecified ligament, initial encounter  predniSONE 10 mg tablet         Patient Instructions   Patient Instructions     Ankle Sprain   WHAT YOU NEED TO KNOW:   An ankle sprain happens when 1 or more ligaments in your ankle joint stretch or tear  Ligaments are tough tissues that connect bones  Ligaments support your joints and keep your bones in place  DISCHARGE INSTRUCTIONS:   Return to the emergency department if:   · You have severe pain in your ankle  · Your foot or toes are cold or numb  · Your ankle becomes more weak or unstable (wobbly)  · You are unable to put any weight on your ankle or foot  · Your swelling has increased or returned  Call your doctor if:   · Your pain does not go away, even after treatment  · You have questions or concerns about your condition or care  Medicines: You may need any of the following:  · NSAIDs , such as ibuprofen, help decrease swelling, pain, and fever  This medicine is available with or without a doctor's order  NSAIDs can cause stomach bleeding or kidney problems in certain people  If you take blood thinner medicine, always ask your healthcare provider if NSAIDs are safe for you  Always read the medicine label and follow directions  · Acetaminophen  decreases pain and fever  It is available without a doctor's order  Ask how much to take and how often to take it  Follow directions  Read the labels of all other medicines you are using to see if they also contain acetaminophen, or ask your doctor or pharmacist  Acetaminophen can cause liver damage if not taken correctly   Do not use more than 4 grams (4,000 milligrams) total of acetaminophen in one day  · Prescription pain medicine  may be given  Ask your healthcare provider how to take this medicine safely  Some prescription pain medicines contain acetaminophen  Do not take other medicines that contain acetaminophen without talking to your healthcare provider  Too much acetaminophen may cause liver damage  Prescription pain medicine may cause constipation  Ask your healthcare provider how to prevent or treat constipation  · Take your medicine as directed  Contact your healthcare provider if you think your medicine is not helping or if you have side effects  Tell him or her if you are allergic to any medicine  Keep a list of the medicines, vitamins, and herbs you take  Include the amounts, and when and why you take them  Bring the list or the pill bottles to follow-up visits  Carry your medicine list with you in case of an emergency  Self-care:   · Use support devices,  such as a brace, cast, or splint, to limit your movement and protect your joint  You may need to use crutches to decrease your pain as you move around  · Go to physical therapy as directed  A physical therapist teaches you exercises to help improve movement and strength, and to decrease pain  · Rest  your ankle so that it can heal  Return to normal activities as directed  · Apply ice  on your ankle for 15 to 20 minutes every hour or as directed  Use an ice pack, or put crushed ice in a plastic bag  Cover it with a towel  Ice helps prevent tissue damage and decreases swelling and pain  · Compress  your ankle  Ask if you should wrap an elastic bandage around your injured ligament  An elastic bandage provides support and helps decrease swelling and movement so your joint can heal  Wear as long as directed  · Elevate  your ankle above the level of your heart as often as you can  This will help decrease swelling and pain   Prop your ankle on pillows or blankets to keep it elevated comfortably  Prevent another ankle sprain:   · Let your ankle heal   Find out how long your ligament needs to heal  Do not do any physical activity until your healthcare provider says it is okay  If you start activity too soon, you may develop a more serious injury  · Always warm up and stretch  before you exercise or play sports  · Use the right equipment  Always wear shoes that fit well and are made for the activity that you are doing  You may also need ankle supports, elbow and knee pads, or braces  Follow up with your doctor as directed:  Write down your questions so you remember to ask them during your visits  © Copyright [employer] 2022 Information is for End User's use only and may not be sold, redistributed or otherwise used for commercial purposes  All illustrations and images included in CareNotes® are the copyrighted property of [employer]  or [facility] Mauro Lewis   The above information is an  only  It is not intended as medical advice for individual conditions or treatments  Talk to your doctor, nurse or pharmacist before following any medical regimen to see if it is safe and effective for you  Follow up with PCP in 3-5 days  Proceed to  ER if symptoms worsen  Chief Complaint     Chief Complaint   Patient presents with    Ankle Injury         History of Present Illness       The patient presents to the clinic complaining of pain in the left ankle  The patient states that she was walking down the steps last sign approximately 5:00 p m  When her ankle twisted  She is complaining of pain and swelling and bruising on the left lateral ankle and her foot  Patient states that pain is a sharp pain as approximately 7/10  The pain seems to be worse with walking and twisting    She states that she has a history of instability of her ankle and has twisted in the past   She states she had a previous injury approximately 1 year ago and had an x-ray at that time was negative  She has been taking ibuprofen for her symptoms with only minimal relief  Review of Systems   Review of Systems   Constitutional: Negative for activity change and fever  HENT: Negative for congestion  Musculoskeletal: Positive for arthralgias and joint swelling  Skin: Positive for color change  Patient complains of bruising as noted above   Neurological: Negative for numbness           Current Medications       Current Outpatient Medications:     amphetamine-dextroamphetamine (ADDERALL) 10 mg tablet, Take 1 tablet by mouth every morning, Disp: , Rfl:     buPROPion (WELLBUTRIN XL) 300 mg 24 hr tablet, Take 1 tablet (300 mg total) by mouth daily, Disp: 30 tablet, Rfl: 5    carBAMazepine (TEGretol XR) 100 mg 12 hr tablet, Take 1 tablet (100 mg total) by mouth every evening, Disp: 180 tablet, Rfl: 3    Flurandrenolide 4 MCG/SQCM TAPE, Apply topically, Disp: , Rfl:     predniSONE 10 mg tablet, 4 po for 2 days, then 3x2, 2x2, and 1x2, Disp: 20 tablet, Rfl: 0    Riboflavin 400 MG CAPS, Take 1 capsule (400 mg total) by mouth daily, Disp: 90 capsule, Rfl: 3    selenium sulfide (SELSUN) 2 5 % shampoo, Apply topically daily as needed for dandruff, Disp: 118 mL, Rfl: 5    Strattera 18 MG capsule, Take 18 mg by mouth every morning, Disp: , Rfl:     triamcinolone (KENALOG) 0 1 % cream, Apply topically, Disp: , Rfl:     zolpidem (AMBIEN) 10 mg tablet, Take 10 mg by mouth every evening, Disp: , Rfl:     lamoTRIgine (LaMICtal) 100 mg tablet, Take 100 mg by mouth every morning (Patient not taking: Reported on 6/29/2022), Disp: , Rfl:     omeprazole (PriLOSEC) 20 mg delayed release capsule, Take 20 mg by mouth daily (Patient not taking: Reported on 6/29/2022), Disp: , Rfl:     SUMAtriptan (IMITREX) 100 mg tablet, take 1 tablet by mouth ONCE if needed for migraines for up to 1 dose (Patient not taking: Reported on 6/29/2022), Disp: 12 tablet, Rfl: 0    valACYclovir (VALTREX) 500 mg tablet, take 1 tablet by mouth once daily, Disp: 30 tablet, Rfl: 5    Zolpidem Tartrate (AMBIEN PO), Take by mouth, Disp: , Rfl:     Current Allergies     Allergies as of 06/29/2022 - Reviewed 06/29/2022   Allergen Reaction Noted    Iodine - food allergy Other (See Comments) 04/23/2019            The following portions of the patient's history were reviewed and updated as appropriate: allergies, current medications, past family history, past medical history, past social history, past surgical history and problem list      Past Medical History:   Diagnosis Date    Diabetes mellitus during pregnancy     Herpes labialis     Resolved 6/1/2016        Past Surgical History:   Procedure Laterality Date    TONSILLECTOMY AND ADENOIDECTOMY         Family History   Problem Relation Age of Onset    Anxiety disorder Mother         NOS    Depression Mother     Diabetes Father     Diabetes Brother     Chromosomal disorder Son     No Known Problems Sister     No Known Problems Daughter     No Known Problems Maternal Grandmother     No Known Problems Paternal Grandmother     No Known Problems Sister     No Known Problems Daughter     No Known Problems Daughter     No Known Problems Maternal Aunt     No Known Problems Paternal Aunt     No Known Problems Paternal Aunt     No Known Problems Paternal Aunt     No Known Problems Paternal Aunt     No Known Problems Paternal Aunt     No Known Problems Paternal Aunt     No Known Problems Paternal Aunt     No Known Problems Paternal Aunt     No Known Problems Paternal Aunt     No Known Problems Paternal Aunt     No Known Problems Paternal Aunt     No Known Problems Paternal Aunt     No Known Problems Paternal Aunt     No Known Problems Paternal Aunt          Medications have been verified          Objective   /76   Pulse 100   Temp 98 °F (36 7 °C)   Resp 20   Ht 5' 5" (1 651 m)   Wt 80 3 kg (177 lb)   SpO2 98%   BMI 29 45 kg/m²        Physical Exam Physical Exam  Musculoskeletal:      Left knee: No swelling, deformity, ecchymosis or crepitus  Normal range of motion  No tenderness  Left lower leg: No swelling  No edema  Left ankle: Ecchymosis present  Tenderness present over the base of 5th metatarsal  Decreased range of motion  Normal pulse  Left Achilles Tendon: No tenderness or defects  Friend's test negative  Left foot: Tenderness present  Legs:       Comments: The patient has ecchymosis, edema, and tenderness noted at the left lateral malleolus as well as the 5th lateral metatarsal    Neurological:      Mental Status: She is alert  Gait: Gait abnormal       Comments: Patient ambulates with a limp  X-ray was reviewed  There is no acute fracture  She has a history of GERD therefore I will give her prednisone for her pain and swelling  I suggested follow-up with her primary care doctor 1 week if her symptoms fail to improve as she may need another x-ray  She may also benefit from an orthopedic consult or further imaging since she has a history of instability of her ankle  She may also benefit from physical therapy

## 2022-06-29 NOTE — PATIENT INSTRUCTIONS
Ankle Sprain   WHAT YOU NEED TO KNOW:   An ankle sprain happens when 1 or more ligaments in your ankle joint stretch or tear  Ligaments are tough tissues that connect bones  Ligaments support your joints and keep your bones in place  DISCHARGE INSTRUCTIONS:   Return to the emergency department if:   You have severe pain in your ankle  Your foot or toes are cold or numb  Your ankle becomes more weak or unstable (wobbly)  You are unable to put any weight on your ankle or foot  Your swelling has increased or returned  Call your doctor if:   Your pain does not go away, even after treatment  You have questions or concerns about your condition or care  Medicines: You may need any of the following:  NSAIDs , such as ibuprofen, help decrease swelling, pain, and fever  This medicine is available with or without a doctor's order  NSAIDs can cause stomach bleeding or kidney problems in certain people  If you take blood thinner medicine, always ask your healthcare provider if NSAIDs are safe for you  Always read the medicine label and follow directions  Acetaminophen  decreases pain and fever  It is available without a doctor's order  Ask how much to take and how often to take it  Follow directions  Read the labels of all other medicines you are using to see if they also contain acetaminophen, or ask your doctor or pharmacist  Acetaminophen can cause liver damage if not taken correctly  Do not use more than 4 grams (4,000 milligrams) total of acetaminophen in one day  Prescription pain medicine  may be given  Ask your healthcare provider how to take this medicine safely  Some prescription pain medicines contain acetaminophen  Do not take other medicines that contain acetaminophen without talking to your healthcare provider  Too much acetaminophen may cause liver damage  Prescription pain medicine may cause constipation  Ask your healthcare provider how to prevent or treat constipation       Take your medicine as directed  Contact your healthcare provider if you think your medicine is not helping or if you have side effects  Tell him or her if you are allergic to any medicine  Keep a list of the medicines, vitamins, and herbs you take  Include the amounts, and when and why you take them  Bring the list or the pill bottles to follow-up visits  Carry your medicine list with you in case of an emergency  Self-care:   Use support devices,  such as a brace, cast, or splint, to limit your movement and protect your joint  You may need to use crutches to decrease your pain as you move around  Go to physical therapy as directed  A physical therapist teaches you exercises to help improve movement and strength, and to decrease pain  Rest  your ankle so that it can heal  Return to normal activities as directed  Apply ice  on your ankle for 15 to 20 minutes every hour or as directed  Use an ice pack, or put crushed ice in a plastic bag  Cover it with a towel  Ice helps prevent tissue damage and decreases swelling and pain  Compress  your ankle  Ask if you should wrap an elastic bandage around your injured ligament  An elastic bandage provides support and helps decrease swelling and movement so your joint can heal  Wear as long as directed  Elevate  your ankle above the level of your heart as often as you can  This will help decrease swelling and pain  Prop your ankle on pillows or blankets to keep it elevated comfortably  Prevent another ankle sprain:   Let your ankle heal   Find out how long your ligament needs to heal  Do not do any physical activity until your healthcare provider says it is okay  If you start activity too soon, you may develop a more serious injury  Always warm up and stretch  before you exercise or play sports  Use the right equipment  Always wear shoes that fit well and are made for the activity that you are doing   You may also need ankle supports, elbow and knee pads, or braces  Follow up with your doctor as directed:  Write down your questions so you remember to ask them during your visits  © Copyright [employer] 2022 Information is for End User's use only and may not be sold, redistributed or otherwise used for commercial purposes  All illustrations and images included in CareNotes® are the copyrighted property of [employer]  or [facility] Mauro Lewis   The above information is an  only  It is not intended as medical advice for individual conditions or treatments  Talk to your doctor, nurse or pharmacist before following any medical regimen to see if it is safe and effective for you

## 2022-07-06 RX ORDER — CITALOPRAM 40 MG/1
40 TABLET ORAL EVERY MORNING
COMMUNITY
Start: 2022-06-22

## 2022-07-11 ENCOUNTER — OFFICE VISIT (OUTPATIENT)
Dept: FAMILY MEDICINE CLINIC | Facility: CLINIC | Age: 47
End: 2022-07-11
Payer: COMMERCIAL

## 2022-07-11 VITALS
BODY MASS INDEX: 30.52 KG/M2 | HEART RATE: 99 BPM | SYSTOLIC BLOOD PRESSURE: 128 MMHG | OXYGEN SATURATION: 100 % | DIASTOLIC BLOOD PRESSURE: 80 MMHG | TEMPERATURE: 97.6 F | WEIGHT: 183.2 LBS | HEIGHT: 65 IN | RESPIRATION RATE: 18 BRPM

## 2022-07-11 DIAGNOSIS — Z11.59 NEED FOR HEPATITIS C SCREENING TEST: ICD-10-CM

## 2022-07-11 DIAGNOSIS — Z13.1 SCREENING FOR DIABETES MELLITUS: ICD-10-CM

## 2022-07-11 DIAGNOSIS — Z00.00 ENCOUNTER FOR ANNUAL PHYSICAL EXAM: Primary | ICD-10-CM

## 2022-07-11 DIAGNOSIS — E66.9 CLASS 1 OBESITY WITH BODY MASS INDEX (BMI) OF 30.0 TO 30.9 IN ADULT, UNSPECIFIED OBESITY TYPE, UNSPECIFIED WHETHER SERIOUS COMORBIDITY PRESENT: ICD-10-CM

## 2022-07-11 DIAGNOSIS — D22.9 ATYPICAL NEVI: ICD-10-CM

## 2022-07-11 DIAGNOSIS — R63.5 WEIGHT GAIN: ICD-10-CM

## 2022-07-11 DIAGNOSIS — R53.83 FATIGUE, UNSPECIFIED TYPE: ICD-10-CM

## 2022-07-11 DIAGNOSIS — R11.2 NAUSEA AND VOMITING, UNSPECIFIED VOMITING TYPE: ICD-10-CM

## 2022-07-11 DIAGNOSIS — Z12.31 ENCOUNTER FOR SCREENING MAMMOGRAM FOR BREAST CANCER: ICD-10-CM

## 2022-07-11 DIAGNOSIS — Z13.29 SCREENING FOR THYROID DISORDER: ICD-10-CM

## 2022-07-11 DIAGNOSIS — Z13.220 SCREENING FOR HYPERLIPIDEMIA: ICD-10-CM

## 2022-07-11 DIAGNOSIS — K21.9 GASTROESOPHAGEAL REFLUX DISEASE, UNSPECIFIED WHETHER ESOPHAGITIS PRESENT: ICD-10-CM

## 2022-07-11 DIAGNOSIS — Z86.32 HISTORY OF GESTATIONAL DIABETES: ICD-10-CM

## 2022-07-11 DIAGNOSIS — E55.9 VITAMIN D DEFICIENCY: ICD-10-CM

## 2022-07-11 DIAGNOSIS — Z11.4 SCREENING FOR HIV (HUMAN IMMUNODEFICIENCY VIRUS): ICD-10-CM

## 2022-07-11 DIAGNOSIS — Z13.0 SCREENING FOR DEFICIENCY ANEMIA: ICD-10-CM

## 2022-07-11 DIAGNOSIS — E53.8 VITAMIN B12 DEFICIENCY: ICD-10-CM

## 2022-07-11 PROCEDURE — 99396 PREV VISIT EST AGE 40-64: CPT | Performed by: NURSE PRACTITIONER

## 2022-07-11 PROCEDURE — 3725F SCREEN DEPRESSION PERFORMED: CPT | Performed by: NURSE PRACTITIONER

## 2022-07-11 RX ORDER — AMITRIPTYLINE HYDROCHLORIDE 10 MG/1
20 TABLET, FILM COATED ORAL
COMMUNITY

## 2022-07-11 RX ORDER — PANTOPRAZOLE SODIUM 40 MG/1
TABLET, DELAYED RELEASE ORAL
Qty: 60 TABLET | Refills: 5 | Status: SHIPPED | OUTPATIENT
Start: 2022-07-11

## 2022-07-11 NOTE — PROGRESS NOTES
Assessment/Plan:      Diagnoses and all orders for this visit:    Encounter for annual physical exam    Need for hepatitis C screening test  -     Hepatitis C Antibody ([facility]); Future    Screening for HIV (human immunodeficiency virus)  -     HIV 1/2 Antigen/Antibody (4th Generation) w Reflex [facility]; Future    Encounter for screening mammogram for breast cancer  -     Mammo screening bilateral w 3d & cad; Future    Weight gain  -     Comprehensive metabolic panel; Future  -     Hemoglobin A1C; Future  -     Insulin, fasting; Future  -     TSH, 3rd generation  -     Ambulatory Referral to Weight Management; Future    Fatigue, unspecified type  -     CBC and differential; Future  -     Comprehensive metabolic panel; Future  -     Hemoglobin A1C; Future  -     Insulin, fasting; Future  -     TSH, 3rd generation    History of gestational diabetes  -     Comprehensive metabolic panel; Future  -     Hemoglobin A1C; Future  -     Insulin, fasting; Future    Nausea and vomiting, unspecified vomiting type    Screening for deficiency anemia  -     CBC and differential; Future    Screening for diabetes mellitus  -     Comprehensive metabolic panel; Future  -     Hemoglobin A1C; Future  -     Insulin, fasting; Future    Screening for hyperlipidemia  -     Lipid panel; Future    Screening for thyroid disorder  -     TSH, 3rd generation    Vitamin B12 deficiency  -     Vitamin B12; Future    Vitamin D deficiency  -     Vitamin D 25 hydroxy; Future    Gastroesophageal reflux disease, unspecified whether esophagitis present  -     pantoprazole (PROTONIX) 40 mg tablet; 1 tab po 2 x daily x 2 months then 1 tab po daily    Class 1 obesity with body mass index (BMI) of 30 0 to 30 9 in adult, unspecified obesity type, unspecified whether serious comorbidity present  -     Ambulatory Referral to Weight Management; Future    Atypical nevi  -     Ambulatory Referral to Dermatology;  Future    Other orders  -     citalopram (CeleXA) 40 mg tablet; Take 40 mg by mouth every morning  -     amitriptyline (ELAVIL) 10 mg tablet; Take 20 mg by mouth daily at bedtime          Subjective:     Patient ID: Sly Henson is a 55 y o  female  Patient presents to office for annual physical exam  Complete medical history and medications reviewed with patient and tolerating all medications well without any problems  Vital signs reviewed and stable  C/O weight gain occurring states she gained approximately 30lbs that gradually occurred over the past year  Patient feels she is not eating a lot to cause weight gain  C/O vomiting occurring in the AM after eating breakfast and during the night when sleeping  Does feel like her vomiting is related to her GERD which she Prilosec 20mg 2 x daily which she feels helps but still has this vomiting  Denies Diarrhea or Constipation occurring  Denies any problems  C/O feeling she has fatigue occurring due to weight gain  C/O left lateral ankle nevi that is changing dark in color, getting larger in size and itching  Denies any other problems or concerns at the present time  Review of Systems    GENERAL:  Feels well, denies any significant changes in weight without trying  C/O weight gain approximately 30 lbs over the past year  SKIN:  C/O left lateral ankle nevi increasing in size, changing dark in color and itching  HEENT:  Denies any head injury or headaches  Negative blurred vision, floaters, spots before eyes, infections, or other vision problems  Negative significant changes in vision or hearing  Negative tinnitus, vertigo, or infections  Negative hay fever, sinus trouble, nasal discharge, bloody noses, or problems with smell  Negative sore throat, bleeding gums, ulcers, or sores  NECK:  Denies lumps, goiter, pain, swollen glands, or lymphadenopathy  BREASTS:  Denies lumps, pain, nipple discharge, swelling, redness, or any other changes    RESPIRATORY:  Denies cough, wheezing, shortness of breath, dyspnea, or orthopnea  CARDIOVASCULAR:  Denies chest pain or palpitations  GASTROINTESTINAL:  Appetite good, denies nausea, C/O vomiting occurring during the night when sleeping and after breakfast   Bowel movements normal occurring about once daily or every other day  URINARY:  Denies frequency, urgency, incontinence, dysuria, hematuria, nocturia, or recent flank pain  GENITAL:  Denies vaginal discharge, pelvic infection, lesions, ulcers, or pain  Negative dyspareunia or abnormal vaginal bleeding  PERIPHERAL VASCULAR:  Denies varicosities, swelling, skin changes, or pain  MUSCULOSKELETAL:  Denies back, joint, or muscle pain  Negative problems with mobility or movement  PSYCHIATRIC:  Denies problems with depression, anxiety, anger, or other psychiatric symptoms  NEUROLOGIC:  Denies fainting, dizziness, memory problems, seizures, tingling, motor or sensory loss  HEMATOLOGIC:  Denies easy bruising, bleeding, or anemia  ENDOCRINE:  Denies thyroid problems, temperature intolerance, excessive sweating, or other endocrine symptoms  Objective:     Physical Exam  Vitals and nursing note reviewed  GENERAL:  Appears well nourished, well groomed, in no acute distress  C/O increased fatigue occurring  SKIN: 0 5cm brown papular nevi of left lateral ankle  HEAD:  Hair is average texture  Scalp without lesions, normocephalic, and atraumatic  EYES:  Visual fields full by confrontation  Conjunctiva pink, sclera white, PERRLA  Extraocular movements intact  EARS:  B/L ear canals clear  B/L TMs clear with + light reflex  Acuity good to whispered voice  NOSE: Mucosa pink, moist, septum midline  Negative sinus tenderness  B/L turbinates pink, moist, non-edematous without exudate  MOUTH:  Oral mucosa pink  Pharynx pink, moist, without swelling, redness, or exudate  Dentition ok  Tongue midline     NECK:  Supple, trachea midline, Negative thyromegaly, lymphadenopathy, or swollen glands  LYMPH NODES:  Negative enlargement of neck, axillary, epitrochlear, or inguinal nodes  THORAX/LUNGS  Thorax symmetric with good excursion  Lungs resonant  Breath sounds vesicular with no added sounds  Diaphragm descends within normal limits  CARDIOVASCULAR:  Carotid upstrokes brisk and without bruits  Apical impulse discrete and tapping, barely palpable in the 5th ICS/MCL  Normal S1 and Normal S2, Negative S3 or S4  Negative murmurs, thrills, lifts, or heaves  ABDOMEN:  Protuberant, bowel sounds normal active x 4 quadrants  Negative tenderness  Negative masses  Negative hepatomegaly  Negative splenomegaly  Negative costovertebral tenderness  EXTREMITIES:  Warm, calves supple, non-tender, negative for edema  Negative stasis pigmentation or ulcers  +2 pulses throughout  MUSCULOSKELETAL:  Negative joint deformities  Good range of motion in hands, wrists, elbows, shoulders, spine, hips, knees, and ankles  Negative spinal curvature  NEUROLOGICAL:  Mental status:  Awake, alert, and oriented to person, place, time, and event  Normal thought processes  BMI Counseling: Body mass index is 30 49 kg/m²  The BMI is above normal  Nutrition recommendations include decreasing portion sizes, encouraging healthy choices of fruits and vegetables, decreasing fast food intake, consuming healthier snacks, limiting drinks that contain sugar, moderation in carbohydrate intake, increasing intake of lean protein, reducing intake of saturated and trans fat and reducing intake of cholesterol  Exercise recommendations include exercising 3-5 times per week  No pharmacotherapy was ordered  Rationale for BMI follow-up plan is due to patient being overweight or obese  Depression Screening and Follow-up Plan: Patient was screened for depression during today's encounter  They screened negative with a PHQ-2 score of 0

## 2022-07-11 NOTE — PATIENT INSTRUCTIONS

## 2022-07-12 ENCOUNTER — APPOINTMENT (OUTPATIENT)
Dept: LAB | Facility: CLINIC | Age: 47
End: 2022-07-12
Payer: COMMERCIAL

## 2022-07-12 DIAGNOSIS — E53.8 VITAMIN B12 DEFICIENCY: ICD-10-CM

## 2022-07-12 DIAGNOSIS — Z11.59 NEED FOR HEPATITIS C SCREENING TEST: ICD-10-CM

## 2022-07-12 DIAGNOSIS — Z11.4 SCREENING FOR HIV (HUMAN IMMUNODEFICIENCY VIRUS): ICD-10-CM

## 2022-07-12 DIAGNOSIS — Z13.29 SCREENING FOR THYROID DISORDER: ICD-10-CM

## 2022-07-12 DIAGNOSIS — Z86.32 HISTORY OF GESTATIONAL DIABETES: ICD-10-CM

## 2022-07-12 DIAGNOSIS — R63.5 WEIGHT GAIN: ICD-10-CM

## 2022-07-12 DIAGNOSIS — Z00.00 ENCOUNTER FOR ANNUAL PHYSICAL EXAM: Primary | ICD-10-CM

## 2022-07-12 DIAGNOSIS — Z13.1 SCREENING FOR DIABETES MELLITUS: ICD-10-CM

## 2022-07-12 DIAGNOSIS — K21.9 GASTROESOPHAGEAL REFLUX DISEASE, UNSPECIFIED WHETHER ESOPHAGITIS PRESENT: ICD-10-CM

## 2022-07-12 DIAGNOSIS — Z13.220 SCREENING FOR HYPERLIPIDEMIA: ICD-10-CM

## 2022-07-12 DIAGNOSIS — R53.83 FATIGUE, UNSPECIFIED TYPE: ICD-10-CM

## 2022-07-12 DIAGNOSIS — E66.9 CLASS 1 OBESITY WITH BODY MASS INDEX (BMI) OF 30.0 TO 30.9 IN ADULT, UNSPECIFIED OBESITY TYPE, UNSPECIFIED WHETHER SERIOUS COMORBIDITY PRESENT: ICD-10-CM

## 2022-07-12 DIAGNOSIS — Z00.00 ENCOUNTER FOR ANNUAL PHYSICAL EXAM: ICD-10-CM

## 2022-07-12 DIAGNOSIS — E55.9 VITAMIN D DEFICIENCY: ICD-10-CM

## 2022-07-12 DIAGNOSIS — Z13.0 SCREENING FOR DEFICIENCY ANEMIA: ICD-10-CM

## 2022-07-12 LAB
25(OH)D3 SERPL-MCNC: 26.7 NG/ML (ref 30–100)
ALBUMIN SERPL BCP-MCNC: 3.6 G/DL (ref 3.5–5)
ALP SERPL-CCNC: 84 U/L (ref 46–116)
ALT SERPL W P-5'-P-CCNC: 36 U/L (ref 12–78)
ANION GAP SERPL CALCULATED.3IONS-SCNC: -1 MMOL/L (ref 4–13)
AST SERPL W P-5'-P-CCNC: 21 U/L (ref 5–45)
BASOPHILS # BLD AUTO: 0.07 THOUSANDS/ΜL (ref 0–0.1)
BASOPHILS NFR BLD AUTO: 1 % (ref 0–1)
BILIRUB SERPL-MCNC: 0.52 MG/DL (ref 0.2–1)
BUN SERPL-MCNC: 13 MG/DL (ref 5–25)
CALCIUM SERPL-MCNC: 8.8 MG/DL (ref 8.3–10.1)
CHLORIDE SERPL-SCNC: 105 MMOL/L (ref 100–108)
CHOLEST SERPL-MCNC: 163 MG/DL
CO2 SERPL-SCNC: 30 MMOL/L (ref 21–32)
CREAT SERPL-MCNC: 0.8 MG/DL (ref 0.6–1.3)
EOSINOPHIL # BLD AUTO: 0.19 THOUSAND/ΜL (ref 0–0.61)
EOSINOPHIL NFR BLD AUTO: 2 % (ref 0–6)
ERYTHROCYTE [DISTWIDTH] IN BLOOD BY AUTOMATED COUNT: 14.2 % (ref 11.6–15.1)
EST. AVERAGE GLUCOSE BLD GHB EST-MCNC: 123 MG/DL
GFR SERPL CREATININE-BSD FRML MDRD: 88 ML/MIN/1.73SQ M
GLUCOSE P FAST SERPL-MCNC: 94 MG/DL (ref 65–99)
HBA1C MFR BLD: 5.9 %
HCT VFR BLD AUTO: 40.2 % (ref 34.8–46.1)
HCV AB SER QL: NORMAL
HDLC SERPL-MCNC: 48 MG/DL
HGB BLD-MCNC: 13.4 G/DL (ref 11.5–15.4)
IMM GRANULOCYTES # BLD AUTO: 0.04 THOUSAND/UL (ref 0–0.2)
IMM GRANULOCYTES NFR BLD AUTO: 0 % (ref 0–2)
INSULIN SERPL-ACNC: 12.2 MU/L (ref 3–25)
LDLC SERPL CALC-MCNC: 77 MG/DL (ref 0–100)
LYMPHOCYTES # BLD AUTO: 1.5 THOUSANDS/ΜL (ref 0.6–4.47)
LYMPHOCYTES NFR BLD AUTO: 16 % (ref 14–44)
MCH RBC QN AUTO: 30.2 PG (ref 26.8–34.3)
MCHC RBC AUTO-ENTMCNC: 33.3 G/DL (ref 31.4–37.4)
MCV RBC AUTO: 91 FL (ref 82–98)
MONOCYTES # BLD AUTO: 0.79 THOUSAND/ΜL (ref 0.17–1.22)
MONOCYTES NFR BLD AUTO: 9 % (ref 4–12)
NEUTROPHILS # BLD AUTO: 6.75 THOUSANDS/ΜL (ref 1.85–7.62)
NEUTS SEG NFR BLD AUTO: 72 % (ref 43–75)
NRBC BLD AUTO-RTO: 0 /100 WBCS
PLATELET # BLD AUTO: 270 THOUSANDS/UL (ref 149–390)
PMV BLD AUTO: 11.4 FL (ref 8.9–12.7)
POTASSIUM SERPL-SCNC: 4.4 MMOL/L (ref 3.5–5.3)
PROT SERPL-MCNC: 7.4 G/DL (ref 6.4–8.2)
RBC # BLD AUTO: 4.44 MILLION/UL (ref 3.81–5.12)
SODIUM SERPL-SCNC: 134 MMOL/L (ref 136–145)
TRIGL SERPL-MCNC: 191 MG/DL
TSH SERPL DL<=0.05 MIU/L-ACNC: 1.42 UIU/ML (ref 0.45–4.5)
VIT B12 SERPL-MCNC: 693 PG/ML (ref 100–900)
WBC # BLD AUTO: 9.34 THOUSAND/UL (ref 4.31–10.16)

## 2022-07-12 PROCEDURE — 82306 VITAMIN D 25 HYDROXY: CPT

## 2022-07-12 PROCEDURE — 82607 VITAMIN B-12: CPT

## 2022-07-12 PROCEDURE — 86803 HEPATITIS C AB TEST: CPT

## 2022-07-12 PROCEDURE — 83525 ASSAY OF INSULIN: CPT

## 2022-07-12 PROCEDURE — 85025 COMPLETE CBC W/AUTO DIFF WBC: CPT

## 2022-07-12 PROCEDURE — 80053 COMPREHEN METABOLIC PANEL: CPT

## 2022-07-12 PROCEDURE — 84443 ASSAY THYROID STIM HORMONE: CPT | Performed by: NURSE PRACTITIONER

## 2022-07-12 PROCEDURE — 83036 HEMOGLOBIN GLYCOSYLATED A1C: CPT

## 2022-07-12 PROCEDURE — 80061 LIPID PANEL: CPT

## 2022-07-12 PROCEDURE — 36415 COLL VENOUS BLD VENIPUNCTURE: CPT

## 2022-07-12 PROCEDURE — 87389 HIV-1 AG W/HIV-1&-2 AB AG IA: CPT

## 2022-07-13 DIAGNOSIS — E55.9 VITAMIN D DEFICIENCY: Primary | ICD-10-CM

## 2022-07-13 LAB — HIV 1+2 AB+HIV1 P24 AG SERPL QL IA: NORMAL

## 2022-07-13 RX ORDER — MELATONIN
2000 DAILY
Qty: 60 TABLET | Refills: 5 | Status: SHIPPED | OUTPATIENT
Start: 2022-07-13 | End: 2022-11-13 | Stop reason: SDUPTHER

## 2022-07-14 ENCOUNTER — HOSPITAL ENCOUNTER (OUTPATIENT)
Dept: RADIOLOGY | Facility: CLINIC | Age: 47
Discharge: HOME | End: 2022-07-14
Payer: COMMERCIAL

## 2022-07-14 VITALS — BODY MASS INDEX: 30.52 KG/M2 | HEIGHT: 65 IN | WEIGHT: 183.2 LBS

## 2022-07-14 DIAGNOSIS — Z12.31 ENCOUNTER FOR SCREENING MAMMOGRAM FOR BREAST CANCER: ICD-10-CM

## 2022-07-14 PROCEDURE — 77063 BREAST TOMOSYNTHESIS BI: CPT

## 2022-07-14 PROCEDURE — 77067 SCR MAMMO BI INCL CAD: CPT

## 2022-07-18 ENCOUNTER — CONSULT (OUTPATIENT)
Dept: BARIATRICS | Facility: CLINIC | Age: 47
End: 2022-07-18
Payer: COMMERCIAL

## 2022-07-18 VITALS
DIASTOLIC BLOOD PRESSURE: 60 MMHG | HEART RATE: 100 BPM | HEIGHT: 65 IN | SYSTOLIC BLOOD PRESSURE: 130 MMHG | WEIGHT: 180.3 LBS | BODY MASS INDEX: 30.04 KG/M2 | RESPIRATION RATE: 16 BRPM

## 2022-07-18 DIAGNOSIS — R63.5 WEIGHT GAIN: ICD-10-CM

## 2022-07-18 DIAGNOSIS — E66.9 CLASS 1 OBESITY WITH BODY MASS INDEX (BMI) OF 30.0 TO 30.9 IN ADULT, UNSPECIFIED OBESITY TYPE, UNSPECIFIED WHETHER SERIOUS COMORBIDITY PRESENT: ICD-10-CM

## 2022-07-18 DIAGNOSIS — R73.03 PREDIABETES: ICD-10-CM

## 2022-07-18 DIAGNOSIS — F90.2 ADHD (ATTENTION DEFICIT HYPERACTIVITY DISORDER), COMBINED TYPE: ICD-10-CM

## 2022-07-18 DIAGNOSIS — E66.9 OBESITY, CLASS I, BMI 30-34.9: Primary | ICD-10-CM

## 2022-07-18 PROCEDURE — 99203 OFFICE O/P NEW LOW 30 MIN: CPT | Performed by: PHYSICIAN ASSISTANT

## 2022-07-18 NOTE — PROGRESS NOTES
-Discussed options of HealthyCORE-Intensive Lifestyle Intervention Program, Very Low Calorie Diet-VLCD and Conservative Program and the role of weight loss medications   -Initial weight loss goal of 5-10% weight loss for improved health  -Screening labs 7/2022  - A1c is 5 9 putting patient in prediabetic range   -Patient is interested in pursuing HealthyCORE-Intensive Lifestyle Intervention Program   - reports very frequent episodes of vomiting at night  States if she eats late at night, despite the type of food, generally wakes up in the night and has to vomit  Was started on protonix per pcp, they advised she work on weight loss and if no improvement will refer to GI  - avoid SLPG1 at this time given her significnat  reflux and vomiting episodes   - NO phentermine - on aderrall  - she is already on wellbutrin   - can consider metformin moving forward given elevated A1c     Assessment/Plan:      Start healthycore   Goals:  Food log (ie ) www myfitnesspal com,sparkpeople  com,loseit com,calorieking  com,etc  baritastic  No sugary beverages  At least 64oz of water daily  Increase physical activity by 10 minutes daily   Gradually increase physical activity to a goal of 5 days per week for 30 minutes of MODERATE intensity PLUS 2 days per week of FULL BODY resistance training  5-10 servings of fruits and vegetables per day and 25-35 grams of dietary fiber per day, gradually increasing    Diagnoses and all orders for this visit:    Obesity, Class I, BMI 30-34 9    Weight gain  -     Ambulatory Referral to Weight Management    Class 1 obesity with body mass index (BMI) of 30 0 to 30 9 in adult, unspecified obesity type, unspecified whether serious comorbidity present  -     Ambulatory Referral to Weight Management    ADHD (attention deficit hyperactivity disorder), combined type    Prediabetes        Subjective:   Chief Complaint   Patient presents with    Consult     MWM consult; waist 39in; goal wt 140; stop bang 2-8 Patient ID: Caro Gusman  is a 55 y o  female with excess weight/obesity here to pursue weight management  Past Medical History:   Diagnosis Date    ADHD     Anxiety and depression     Diabetes mellitus during pregnancy     Herpes labialis     Resolved 6/1/2016      HPI:  Obesity/Excess Weight:  Severity: class I  Onset:  6-7 months ago   Modifiers: Diet and Exercise and Commercial Weight Loss Programs-ie  Weight Watchers, Vivi Cones, Nutrisystem, etc   Contributing factors: Insufficient Physical Activity  Associated symptoms: fatigue  Colonoscopy-utd 2020    Goals: 140 lbs   Tobacco: no  Alcohol: no      The following portions of the patient's history were reviewed and updated as appropriate: allergies, current medications, past family history, past medical history, past social history, past surgical history and problem list     Review of Systems   Constitutional: Negative  Respiratory: Negative  Cardiovascular: Negative  Gastrointestinal: Negative  Neurological: Negative  Psychiatric/Behavioral: Negative  Objective:    /60   Pulse 100   Resp 16   Ht 5' 5" (1 651 m)   Wt 81 8 kg (180 lb 4 8 oz)   Breastfeeding No   BMI 30 00 kg/m²     Physical Exam  Vitals and nursing note reviewed  Constitutional:       Appearance: Normal appearance  She is obese  HENT:      Head: Normocephalic and atraumatic  Eyes:      Extraocular Movements: Extraocular movements intact  Pupils: Pupils are equal, round, and reactive to light  Cardiovascular:      Rate and Rhythm: Normal rate  Pulmonary:      Effort: Pulmonary effort is normal    Musculoskeletal:         General: Normal range of motion  Cervical back: Normal range of motion  Skin:     General: Skin is warm and dry  Neurological:      General: No focal deficit present  Mental Status: She is alert and oriented to person, place, and time     Psychiatric:         Mood and Affect: Mood normal

## 2022-07-18 NOTE — PATIENT INSTRUCTIONS
Start healthycore   Goals:  Food log (ie ) www myfitnesspal com,sparkpeople  com,Ctripit com,calorieking  com,etc  baritastic  No sugary beverages  At least 64oz of water daily  Increase physical activity by 10 minutes daily   Gradually increase physical activity to a goal of 5 days per week for 30 minutes of MODERATE intensity PLUS 2 days per week of FULL BODY resistance training  5-10 servings of fruits and vegetables per day and 25-35 grams of dietary fiber per day, gradually increasing

## 2022-07-19 DIAGNOSIS — K21.9 GASTROESOPHAGEAL REFLUX DISEASE WITHOUT ESOPHAGITIS: Primary | ICD-10-CM

## 2022-08-18 ENCOUNTER — APPOINTMENT (OUTPATIENT)
Dept: URGENT CARE | Facility: MEDICAL CENTER | Age: 47
End: 2022-08-18
Payer: OTHER MISCELLANEOUS

## 2022-08-18 PROCEDURE — G0382 LEV 3 HOSP TYPE B ED VISIT: HCPCS

## 2022-08-18 PROCEDURE — 99283 EMERGENCY DEPT VISIT LOW MDM: CPT

## 2022-08-22 ENCOUNTER — TELEPHONE (OUTPATIENT)
Dept: MAMMOGRAPHY | Facility: CLINIC | Age: 47
End: 2022-08-22

## 2022-08-22 NOTE — TELEPHONE ENCOUNTER
Scheduling department was unsuccessful in contacting the above patient for her diagnostic follow up mammogram/ultrasound  They left messages for her on 8/2 and 8/10 with no response to schedule  Please attempt to contact this patient and have her schedule these appointments

## 2022-08-22 NOTE — TELEPHONE ENCOUNTER
Philippe Lopes,    We were unsuccessful in contacting the above patient for her diagnostic follow up mammogram/ultrasound  We have left messages for her on 8/2 and 8/10 with no response to schedule  Please attempt to contact this patient and have them schedule their appointment  Please let me know if you have any questions or if I can be of any further assistance      Thank you,  Matti Jenkins, MSN, RN, CN-BN  Breast Nurse Navigator

## 2022-08-24 ENCOUNTER — TELEPHONE (OUTPATIENT)
Dept: FAMILY MEDICINE CLINIC | Facility: CLINIC | Age: 47
End: 2022-08-24

## 2022-08-24 NOTE — TELEPHONE ENCOUNTER
Patient states she will call to schedule mammogram appt once she gets her work schedule  Please remove head staples on 5/20/2018   May take shower-Let water run over the surgical area and pat dry after shower.   If notices any new weakness, numbness, tingling, seizure or fever then please come back to the hospital.   Left upper extremity splint Please remove head staples on 5/20/2018   May take shower-Let water run over the surgical area and pat dry after shower.   If notices any new weakness, numbness, tingling, seizure or fever then please come back to the hospital.   Please do decadron taper as instructed.   Left upper extremity splint

## 2022-11-14 ENCOUNTER — TELEPHONE (OUTPATIENT)
Dept: FAMILY MEDICINE CLINIC | Facility: CLINIC | Age: 47
End: 2022-11-14

## 2022-11-14 NOTE — TELEPHONE ENCOUNTER
Regarding: FW: Health   I am not very familiar with this patient  Perhaps this should have gone to you?      SHAHEEN Uribe    ----- Message -----  From: Delmy Flores  Sent: 11/14/2022   6:51 AM EST  To: Jesus Douglass DO  Subject: Health                                           ----- Message from Delmy Flores sent at 11/14/2022  6:51 AM EST -----       ----- Message from Anastasiia Overton to Jesus Douglass DO sent at 11/13/2022  7:13 PM -----   Hello! I am very worried about my health  I keep having difficulty sleeping; I feel like I am having difficulty breathing  I wake up to throw up  I feel exhausted all day  I need something to start losing weight  I am about 180 lb  I when over the summer to see the weight management, which did not work for me  Please advise me on what I can do  I know that my weight gain is the problem  I have tried the Mediterranean diet using my plate  Weight watcher  Nothing is working

## 2022-12-09 ENCOUNTER — OFFICE VISIT (OUTPATIENT)
Dept: URGENT CARE | Facility: CLINIC | Age: 47
End: 2022-12-09

## 2022-12-09 VITALS
TEMPERATURE: 100 F | SYSTOLIC BLOOD PRESSURE: 126 MMHG | BODY MASS INDEX: 29.75 KG/M2 | WEIGHT: 178.8 LBS | OXYGEN SATURATION: 98 % | HEART RATE: 109 BPM | RESPIRATION RATE: 20 BRPM | DIASTOLIC BLOOD PRESSURE: 77 MMHG

## 2022-12-09 DIAGNOSIS — R05.1 ACUTE COUGH: ICD-10-CM

## 2022-12-09 DIAGNOSIS — J06.9 UPPER RESPIRATORY TRACT INFECTION, UNSPECIFIED TYPE: Primary | ICD-10-CM

## 2022-12-09 LAB
SARS-COV-2 AG UPPER RESP QL IA: NEGATIVE
VALID CONTROL: NORMAL

## 2022-12-09 RX ORDER — BENZONATATE 200 MG/1
200 CAPSULE ORAL 3 TIMES DAILY PRN
Qty: 20 CAPSULE | Refills: 0 | Status: SHIPPED | OUTPATIENT
Start: 2022-12-09

## 2022-12-09 RX ORDER — CEFDINIR 300 MG/1
300 CAPSULE ORAL EVERY 12 HOURS SCHEDULED
Qty: 14 CAPSULE | Refills: 0 | Status: SHIPPED | OUTPATIENT
Start: 2022-12-09 | End: 2022-12-15 | Stop reason: ALTCHOICE

## 2022-12-09 NOTE — LETTER
[facility] [address] [address]  Dept: [phone]    December 9, 2022    Patient: Ja Kate  YOB: 1975    Ja Kate was seen and evaluated at our [facility]  Please note if Covid and Flu tests are negative, they may return to work when fever free for 24 hours without the use of a fever reducing agent  If Covid or Flu test is positive, they may return to work on 12/12/2022, as this is 5 days from the onset of symptoms  Upon return, they must then adhere to strict masking for an additional 5 days      Sincerely,    Jesús Gonzalez PA-C

## 2022-12-09 NOTE — PATIENT INSTRUCTIONS
Upper Respiratory Infection   WHAT YOU NEED TO KNOW:   An upper respiratory infection is also called a cold  It can affect your nose, throat, ears, and sinuses  Cold symptoms are usually worst for the first 3 to 5 days  Most people get better in 7 to 14 days  You may continue to cough for 2 to 3 weeks  Colds are caused by viruses and do not get better with antibiotics  DISCHARGE INSTRUCTIONS:   Call your local emergency number (911 in the [address] [place] [address],[address]) if:   You have chest pain or trouble breathing  Return to the emergency department if:   You have a fever over 102ºF (39ºC)  Call your doctor if:   You have a low fever  Your sore throat gets worse or you see white or yellow spots in your throat  Your symptoms get worse after 3 to 5 days or are not better in 14 days  You have a rash anywhere on your skin  You have large, tender lumps in your neck  You have thick, green, or yellow drainage from your nose  You cough up thick yellow, green, or bloody mucus  You have a bad earache  You have questions or concerns about your condition or care  Medicines: You may need any of the following:  Decongestants  help reduce nasal congestion and help you breathe more easily  If you take decongestant pills, they may make you feel restless or cause problems with your sleep  Do not use decongestant sprays for more than a few days  Cough suppressants  help reduce coughing  Ask your healthcare provider which type of cough medicine is best for you  NSAIDs , such as ibuprofen, help decrease swelling, pain, and fever  NSAIDs can cause stomach bleeding or kidney problems in certain people  If you take blood thinner medicine, always ask your healthcare provider if NSAIDs are safe for you  Always read the medicine label and follow directions  Acetaminophen  decreases pain and fever  It is available without a doctor's order  Ask how much to take and how often to take it  Follow directions   Read the labels of all other medicines you are using to see if they also contain acetaminophen, or ask your doctor or pharmacist  Acetaminophen can cause liver damage if not taken correctly  Do not use more than 4 grams (4,000 milligrams) total of acetaminophen in one day  Take your medicine as directed  Contact your healthcare provider if you think your medicine is not helping or if you have side effects  Tell him or her if you are allergic to any medicine  Keep a list of the medicines, vitamins, and herbs you take  Include the amounts, and when and why you take them  Bring the list or the pill bottles to follow-up visits  Carry your medicine list with you in case of an emergency  Self-care:   Rest as much as possible  Slowly start to do more each day  Drink more liquids as directed  Liquids will help thin and loosen mucus so you can cough it up  Liquids will also help prevent dehydration  Liquids that help prevent dehydration include water, fruit juice, and broth  Do not drink liquids that contain caffeine  Caffeine can increase your risk for dehydration  Ask your healthcare provider how much liquid to drink each day  Soothe a sore throat  Gargle with warm salt water  Make salt water by dissolving ¼ teaspoon salt in 1 cup warm water  You may also suck on hard candy or throat lozenges  You may use a sore throat spray  Use a humidifier or vaporizer  Use a cool mist humidifier or a vaporizer to increase air moisture in your home  This may make it easier for you to breathe and help decrease your cough  Use saline nasal drops as directed  These help relieve congestion  Apply petroleum-based jelly around the outside of your nostrils  This can decrease irritation from blowing your nose  Do not smoke  Nicotine and other chemicals in cigarettes and cigars can make your symptoms worse  They can also cause infections such as bronchitis or pneumonia   Ask your healthcare provider for information if you currently smoke and need help to quit  E-cigarettes or smokeless tobacco still contain nicotine  Talk to your healthcare provider before you use these products  Prevent a cold: Wash your hands often  Use soap and water every time you wash your hands  Rub your soapy hands together, lacing your fingers  Use the fingers of one hand to scrub under the nails of the other hand  Wash for at least 20 seconds  Rinse with warm, running water for several seconds  Then dry your hands  Use germ-killing gel if soap and water are not available  Do not touch your eyes or mouth without washing your hands first          Cover a sneeze or cough  Use a tissue that covers your mouth and nose  Put the used tissue in the trash right away  Use the bend of your arm if a tissue is not available  Wash your hands well with soap and water or use a hand   Do not stand close to anyone who is sneezing or coughing  Try to stay away from others while you are sick  This is especially important during the first 2 to 3 days when the virus is more easily spread  Wait until a fever, cough, or other symptoms are gone before you return to work or other regular activities  Do not share items while you are sick  This includes food, drinks, eating utensils, and dishes  Follow up with your doctor as directed:  Write down your questions so you remember to ask them during your visits  © Copyright [employer] 2022 Information is for End User's use only and may not be sold, redistributed or otherwise used for commercial purposes  All illustrations and images included in CareNotes® are the copyrighted property of A D A M [employer] Inc  or [facility] [employer]   The above information is an  only  It is not intended as medical advice for individual conditions or treatments  Talk to your doctor, nurse or pharmacist before following any medical regimen to see if it is safe and effective for you

## 2022-12-09 NOTE — PROGRESS NOTES
[employer] [facility]        NAME: Neda Rodrigez is a 52 y o  female  : 1975    MRN: [id number]  DATE: 2022  TIME: 12:53 PM    Assessment and Plan   Upper respiratory tract infection, unspecified type [J06 9]  1  Upper respiratory tract infection, unspecified type  Poct Covid 19 Rapid Antigen Test    benzonatate (TESSALON) 200 MG capsule    cefdinir (OMNICEF) 300 mg capsule      2  Acute cough  Poct Covid 19 Rapid Antigen Test    Covid/Flu-Office Collect            Patient Instructions   Patient Instructions     Upper Respiratory Infection   WHAT YOU NEED TO KNOW:   An upper respiratory infection is also called a cold  It can affect your nose, throat, ears, and sinuses  Cold symptoms are usually worst for the first 3 to 5 days  Most people get better in 7 to 14 days  You may continue to cough for 2 to 3 weeks  Colds are caused by viruses and do not get better with antibiotics  DISCHARGE INSTRUCTIONS:   Call your local emergency number (911 in the [address] [place] [address],[address]) if:   · You have chest pain or trouble breathing  Return to the emergency department if:   · You have a fever over 102ºF (39ºC)  Call your doctor if:   · You have a low fever  · Your sore throat gets worse or you see white or yellow spots in your throat  · Your symptoms get worse after 3 to 5 days or are not better in 14 days  · You have a rash anywhere on your skin  · You have large, tender lumps in your neck  · You have thick, green, or yellow drainage from your nose  · You cough up thick yellow, green, or bloody mucus  · You have a bad earache  · You have questions or concerns about your condition or care  Medicines: You may need any of the following:  · Decongestants  help reduce nasal congestion and help you breathe more easily  If you take decongestant pills, they may make you feel restless or cause problems with your sleep  Do not use decongestant sprays for more than a few days      · Cough suppressants help reduce coughing  Ask your healthcare provider which type of cough medicine is best for you  · NSAIDs , such as ibuprofen, help decrease swelling, pain, and fever  NSAIDs can cause stomach bleeding or kidney problems in certain people  If you take blood thinner medicine, always ask your healthcare provider if NSAIDs are safe for you  Always read the medicine label and follow directions  · Acetaminophen  decreases pain and fever  It is available without a doctor's order  Ask how much to take and how often to take it  Follow directions  Read the labels of all other medicines you are using to see if they also contain acetaminophen, or ask your doctor or pharmacist  Acetaminophen can cause liver damage if not taken correctly  Do not use more than 4 grams (4,000 milligrams) total of acetaminophen in one day  · Take your medicine as directed  Contact your healthcare provider if you think your medicine is not helping or if you have side effects  Tell him or her if you are allergic to any medicine  Keep a list of the medicines, vitamins, and herbs you take  Include the amounts, and when and why you take them  Bring the list or the pill bottles to follow-up visits  Carry your medicine list with you in case of an emergency  Self-care:   · Rest as much as possible  Slowly start to do more each day  · Drink more liquids as directed  Liquids will help thin and loosen mucus so you can cough it up  Liquids will also help prevent dehydration  Liquids that help prevent dehydration include water, fruit juice, and broth  Do not drink liquids that contain caffeine  Caffeine can increase your risk for dehydration  Ask your healthcare provider how much liquid to drink each day  · Soothe a sore throat  Gargle with warm salt water  Make salt water by dissolving ¼ teaspoon salt in 1 cup warm water  You may also suck on hard candy or throat lozenges  You may use a sore throat spray      · Use a humidifier or vaporizer  Use a cool mist humidifier or a vaporizer to increase air moisture in your home  This may make it easier for you to breathe and help decrease your cough  · Use saline nasal drops as directed  These help relieve congestion  · Apply petroleum-based jelly around the outside of your nostrils  This can decrease irritation from blowing your nose  · Do not smoke  Nicotine and other chemicals in cigarettes and cigars can make your symptoms worse  They can also cause infections such as bronchitis or pneumonia  Ask your healthcare provider for information if you currently smoke and need help to quit  E-cigarettes or smokeless tobacco still contain nicotine  Talk to your healthcare provider before you use these products  Prevent a cold:   · Wash your hands often  Use soap and water every time you wash your hands  Rub your soapy hands together, lacing your fingers  Use the fingers of one hand to scrub under the nails of the other hand  Wash for at least 20 seconds  Rinse with warm, running water for several seconds  Then dry your hands  Use germ-killing gel if soap and water are not available  Do not touch your eyes or mouth without washing your hands first          · Cover a sneeze or cough  Use a tissue that covers your mouth and nose  Put the used tissue in the trash right away  Use the bend of your arm if a tissue is not available  Wash your hands well with soap and water or use a hand   Do not stand close to anyone who is sneezing or coughing  · Try to stay away from others while you are sick  This is especially important during the first 2 to 3 days when the virus is more easily spread  Wait until a fever, cough, or other symptoms are gone before you return to work or other regular activities  · Do not share items while you are sick  This includes food, drinks, eating utensils, and dishes      Follow up with your doctor as directed:  Write down your questions so you remember to ask them during your visits  © Copyright [employer] 2022 Information is for End User's use only and may not be sold, redistributed or otherwise used for commercial purposes  All illustrations and images included in CareNotes® are the copyrighted property of A D A M [employer] Inc  or Chantale Morales  The above information is an  only  It is not intended as medical advice for individual conditions or treatments  Talk to your doctor, nurse or pharmacist before following any medical regimen to see if it is safe and effective for you  Follow up with PCP in 3-5 days  Proceed to  ER if symptoms worsen  Chief Complaint     Chief Complaint   Patient presents with   • Cold Like Symptoms     Headache, body aches, vomiting and diarrhea and chills since Tuesday  Using Muninex, Ibu, Tylenol and Thera Flu             History of Present Illness       Patient presents to the clinic for cough, nasal congestion, diarrhea, fevers, chills, and body aches for 4 days  The patient is not vaccinated against the flu  Review of Systems   Review of Systems   Constitutional: Positive for fever  Negative for chills  T max 102   HENT: Positive for rhinorrhea  Negative for congestion, drooling, ear pain, postnasal drip and sore throat  Eyes: Negative for pain and visual disturbance  Respiratory: Positive for cough  Negative for shortness of breath and wheezing  Cardiovascular: Negative for chest pain and palpitations  Gastrointestinal: Positive for diarrhea and vomiting  Negative for abdominal pain  Genitourinary: Negative for dysuria and hematuria  Musculoskeletal: Positive for myalgias  Negative for arthralgias and back pain  Skin: Negative for color change and rash  Neurological: Positive for headaches  Negative for seizures and syncope  All other systems reviewed and are negative          Current Medications       Current Outpatient Medications:   •  amitriptyline (ELAVIL) 10 mg tablet, Take 20 mg by mouth daily at bedtime, Disp: , Rfl:   •  amphetamine-dextroamphetamine (ADDERALL) 10 mg tablet, Take 1 tablet by mouth every morning, Disp: , Rfl:   •  benzonatate (TESSALON) 200 MG capsule, Take 1 capsule (200 mg total) by mouth 3 (three) times a day as needed for cough, Disp: 20 capsule, Rfl: 0  •  buPROPion (WELLBUTRIN XL) 300 mg 24 hr tablet, Take 1 tablet (300 mg total) by mouth daily, Disp: 30 tablet, Rfl: 5  •  cefdinir (OMNICEF) 300 mg capsule, Take 1 capsule (300 mg total) by mouth every 12 (twelve) hours for 7 days, Disp: 14 capsule, Rfl: 0  •  cholecalciferol (VITAMIN D3) 1,000 units tablet, Take 2 tablets (2,000 Units total) by mouth daily, Disp: 60 tablet, Rfl: 5  •  citalopram (CeleXA) 40 mg tablet, Take 40 mg by mouth every morning, Disp: , Rfl:   •  lamoTRIgine (LaMICtal) 100 mg tablet, Take 100 mg by mouth every morning, Disp: , Rfl:   •  pantoprazole (PROTONIX) 40 mg tablet, 1 tab po 2 x daily x 2 months then 1 tab po daily, Disp: 60 tablet, Rfl: 5  •  selenium sulfide (SELSUN) 2 5 % shampoo, Apply topically daily as needed for dandruff, Disp: 118 mL, Rfl: 5  •  Strattera 18 MG capsule, Take 18 mg by mouth every morning, Disp: , Rfl:   •  SUMAtriptan (IMITREX) 100 mg tablet, Take 1 tablet (100 mg total) by mouth once as needed for migraine for up to 1 dose, Disp: 12 tablet, Rfl: 5  •  valACYclovir (VALTREX) 500 mg tablet, Take 1 tablet (500 mg total) by mouth daily, Disp: 30 tablet, Rfl: 5  •  zolpidem (AMBIEN) 10 mg tablet, Take 10 mg by mouth every evening, Disp: , Rfl:     Current Allergies     Allergies as of 12/09/2022 - Reviewed 12/09/2022   Allergen Reaction Noted   • Iodine - food allergy Other (See Comments) 04/23/2019   • Zofran [ondansetron] Itching 07/11/2022            The following portions of the patient's history were reviewed and updated as appropriate: allergies, current medications, past family history, past medical history, past social history, past surgical history and problem list      Past Medical History:   Diagnosis Date   • ADHD    • Anxiety and depression    • Diabetes mellitus during pregnancy    • Herpes labialis     Resolved 6/1/2016        Past Surgical History:   Procedure Laterality Date   • TONSILLECTOMY AND ADENOIDECTOMY         Family History   Problem Relation Age of Onset   • Anxiety disorder Mother         NOS   • Depression Mother    • Diabetes Father    • No Known Problems Sister    • No Known Problems Sister    • No Known Problems Daughter    • No Known Problems Daughter    • No Known Problems Daughter    • No Known Problems Maternal Grandmother    • No Known Problems Maternal Grandfather    • No Known Problems Paternal Grandmother    • No Known Problems Paternal Grandfather    • Diabetes Brother    • Chromosomal disorder Son    • No Known Problems Maternal Aunt    • No Known Problems Paternal Aunt    • No Known Problems Paternal Aunt    • No Known Problems Paternal Aunt    • No Known Problems Paternal Aunt    • No Known Problems Paternal Aunt    • No Known Problems Paternal Aunt    • No Known Problems Paternal Aunt    • No Known Problems Paternal Aunt    • No Known Problems Paternal Aunt    • No Known Problems Paternal Aunt    • No Known Problems Paternal Aunt    • No Known Problems Paternal Aunt    • No Known Problems Paternal Aunt    • No Known Problems Paternal Aunt    • Breast cancer Neg Hx    • BRCA1 Positive Neg Hx    • BRCA1 Negative Neg Hx    • Ovarian cancer Neg Hx    • BRCA 1/2 Neg Hx    • Endometrial cancer Neg Hx    • Colon cancer Neg Hx    • Breast cancer additional onset Neg Hx    • BRCA2 Negative Neg Hx    • BRCA2 Positive Neg Hx          Medications have been verified  Objective   /77   Pulse (!) 109   Temp 100 °F (37 8 °C)   Resp 20   Wt 81 1 kg (178 lb 12 8 oz)   SpO2 98%   BMI 29 75 kg/m²        Physical Exam     Physical Exam  Constitutional:       Appearance: She is well-developed   She is not diaphoretic  HENT:      Head: Normocephalic  Nose: Congestion and rhinorrhea present  Comments: Green discharge is noted in the nares  Eyes:      General:         Right eye: No discharge  Left eye: No discharge  Pupils: Pupils are equal, round, and reactive to light  Neck:      Thyroid: No thyromegaly  Cardiovascular:      Rate and Rhythm: Normal rate  Heart sounds: No murmur heard  Pulmonary:      Effort: Pulmonary effort is normal  No respiratory distress  Breath sounds: Rhonchi present  No wheezing or rales  Chest:      Chest wall: No tenderness  Abdominal:      General: There is no distension  Palpations: Abdomen is soft  Tenderness: There is no abdominal tenderness  There is no guarding or rebound  Musculoskeletal:         General: Normal range of motion  Cervical back: Normal range of motion  Lymphadenopathy:      Cervical: No cervical adenopathy  Skin:     General: Skin is warm  Neurological:      Mental Status: She is alert and oriented to person, place, and time  -Rapid COVID is negative  The patient likely has viral syndrome  I suggest supportive treatment for now  I will treat with Tessalon Perles  If symptoms fail to improve in the next 48 to 72 hours I did write a prescription for Omnicef    The patient will follow up with her PCP or go to the ER if symptoms worsen DISPLAY PLAN FREE TEXT

## 2022-12-10 LAB
FLUAV RNA RESP QL NAA+PROBE: NEGATIVE
FLUBV RNA RESP QL NAA+PROBE: NEGATIVE
SARS-COV-2 RNA RESP QL NAA+PROBE: NEGATIVE

## 2022-12-15 ENCOUNTER — OFFICE VISIT (OUTPATIENT)
Dept: FAMILY MEDICINE CLINIC | Facility: CLINIC | Age: 47
End: 2022-12-15

## 2022-12-15 VITALS
HEIGHT: 65 IN | DIASTOLIC BLOOD PRESSURE: 80 MMHG | TEMPERATURE: 97 F | OXYGEN SATURATION: 99 % | SYSTOLIC BLOOD PRESSURE: 151 MMHG | WEIGHT: 184 LBS | RESPIRATION RATE: 18 BRPM | HEART RATE: 97 BPM | BODY MASS INDEX: 30.66 KG/M2

## 2022-12-15 DIAGNOSIS — R10.13 EPIGASTRIC PAIN: ICD-10-CM

## 2022-12-15 DIAGNOSIS — R73.03 PRE-DIABETES: ICD-10-CM

## 2022-12-15 DIAGNOSIS — R19.7 DIARRHEA, UNSPECIFIED TYPE: ICD-10-CM

## 2022-12-15 DIAGNOSIS — J04.0 LARYNGITIS: ICD-10-CM

## 2022-12-15 DIAGNOSIS — J40 BRONCHITIS: Primary | ICD-10-CM

## 2022-12-15 RX ORDER — PREDNISONE 20 MG/1
40 TABLET ORAL DAILY
Qty: 10 TABLET | Refills: 0 | Status: SHIPPED | OUTPATIENT
Start: 2022-12-15 | End: 2022-12-20

## 2022-12-15 RX ORDER — AZITHROMYCIN 250 MG/1
TABLET, FILM COATED ORAL
Qty: 6 TABLET | Refills: 0 | Status: SHIPPED | OUTPATIENT
Start: 2022-12-15 | End: 2022-12-20

## 2022-12-15 NOTE — LETTER
December 15, 2022     Patient: Madi Ramos  YOB: 1975  Date of Visit: 12/15/2022      To Whom it May Concern:    Gaviota Orlando is under my professional care  Aura Zavala was seen in my office on 12/15/2022  Aura Zavala may return to work on 12/19/22  If you have any questions or concerns, please don't hesitate to call           Sincerely,          Leola Vila, DO

## 2022-12-15 NOTE — PROGRESS NOTES
Name: Hong Hilton      : 1975      MRN: [id number]  Encounter Provider: Jeffrey Aguilar DO  Encounter Date: 12/15/2022   Encounter department: [address] [place] [address]     1  Bronchitis  - will tx with Zpak and steroid burst   Rest/hydration  Given work note to return on Monday  - azithromycin (Zithromax) 250 mg tablet; Take 2 tablets (500 mg total) by mouth daily for 1 day, THEN 1 tablet (250 mg total) daily for 4 days  Dispense: 6 tablet; Refill: 0  - predniSONE 20 mg tablet; Take 2 tablets (40 mg total) by mouth daily for 5 days  Dispense: 10 tablet; Refill: 0    2  Laryngitis  - azithromycin (Zithromax) 250 mg tablet; Take 2 tablets (500 mg total) by mouth daily for 1 day, THEN 1 tablet (250 mg total) daily for 4 days  Dispense: 6 tablet; Refill: 0  - predniSONE 20 mg tablet; Take 2 tablets (40 mg total) by mouth daily for 5 days  Dispense: 10 tablet; Refill: 0    3  Pre-diabetes  Will repeat labs  Increased thirst of late  She can have labs when feeling better and return to clinic     - HEMOGLOBIN A1C W/ EAG ESTIMATION; Future  - Comprehensive metabolic panel; Future    4  Epigastric pain  - she will let me know if persistent symptoms despite treatment above and will get stool studies in addition to labs  She is on board  She is likely with viral syndrome -- treating as per above with abx given issues with breathing and lung exam     - Comprehensive metabolic panel; Future  - Lipase; Future  - CBC and differential; Future    5  Diarrhea, unspecified type  - Lipase; Future    Return for Martin for Obesity and discussion for her weight   Patient/Caretaker verbalized understanding and were in agreement with today's assessment and plan  Time was taken to address any questions patient/caretaker had  Indication/Risks/Benefits of medication(s) as prescribed were discussed with the patient/caretaker    The patient verbalized understanding and agreement and elects to take medications as prescribed  Time was taken to answer any questions the patient/caretaker may have had  BMI Counseling: Body mass index is 30 62 kg/m²  The BMI is above normal  Nutrition recommendations include decreasing portion sizes, encouraging healthy choices of fruits and vegetables, decreasing fast food intake, consuming healthier snacks, limiting drinks that contain sugar, reducing intake of saturated and trans fat and reducing intake of cholesterol  Exercise recommendations include exercising 3-5 times per week and strength training exercises  Rationale for BMI follow-up plan is due to patient being overweight or obese  Chief Complaint   Patient presents with   • Cold Like Symptoms   • Weight Gain       Subjective      Patient with one week h/o diarrhea, and cough and congestion  She is also with fever and body aches  There are sweats  She did see UC last week and had negative Covid and Flu  It is watery diarrhea, not bloody  She admits to stomach cramping  She tells me she was Rx abx but did not fill due to diarrhea  She is very tired as well  There is some sob with activity due to chest congestion  She is with increased thirst as well  She has now lost her voice as well  There is no vomiting  She is concerned about the chest tightness with breathing and course breathing   -- Michelle Hirsch     She is also concerned about increased thirst of late, issues losing weight, etc   Is willing to have labs and return in f/u for discussion  Did get a referral to weight loss clinic, was not able to fulfill due to busy schedule  Has watched her diet but is struggling with weight gain  Review of Systems   All other systems reviewed and are negative        Current Outpatient Medications on File Prior to Visit   Medication Sig   • amitriptyline (ELAVIL) 10 mg tablet Take 20 mg by mouth daily at bedtime   • amphetamine-dextroamphetamine (ADDERALL) 10 mg tablet Take 1 tablet by mouth every morning   • benzonatate (TESSALON) 200 MG capsule Take 1 capsule (200 mg total) by mouth 3 (three) times a day as needed for cough   • buPROPion (WELLBUTRIN XL) 300 mg 24 hr tablet Take 1 tablet (300 mg total) by mouth daily   • cholecalciferol (VITAMIN D3) 1,000 units tablet Take 2 tablets (2,000 Units total) by mouth daily   • citalopram (CeleXA) 40 mg tablet Take 40 mg by mouth every morning   • lamoTRIgine (LaMICtal) 100 mg tablet Take 100 mg by mouth every morning   • pantoprazole (PROTONIX) 40 mg tablet 1 tab po 2 x daily x 2 months then 1 tab po daily   • selenium sulfide (SELSUN) 2 5 % shampoo Apply topically daily as needed for dandruff   • Strattera 18 MG capsule Take 18 mg by mouth every morning   • SUMAtriptan (IMITREX) 100 mg tablet Take 1 tablet (100 mg total) by mouth once as needed for migraine for up to 1 dose   • zolpidem (AMBIEN) 10 mg tablet Take 10 mg by mouth every evening   • valACYclovir (VALTREX) 500 mg tablet Take 1 tablet (500 mg total) by mouth daily   • [DISCONTINUED] cefdinir (OMNICEF) 300 mg capsule Take 1 capsule (300 mg total) by mouth every 12 (twelve) hours for 7 days (Patient not taking: Reported on 12/15/2022)       Objective     /80 (BP Location: Left arm, Patient Position: Sitting, Cuff Size: Large)   Pulse 97   Temp (!) 97 °F (36 1 °C) (Temporal)   Resp 18   Ht 5' 5" (1 651 m)   Wt 83 5 kg (184 lb)   SpO2 99%   BMI 30 62 kg/m²     Physical Exam  Vitals and nursing note reviewed  Constitutional:       General: She is not in acute distress  Appearance: She is ill-appearing (coughing/stressed)  She is not toxic-appearing  HENT:      Head: Normocephalic and atraumatic        Right Ear: Tympanic membrane and ear canal normal       Left Ear: Tympanic membrane and ear canal normal       Nose:      Comments: Boggy, erythematous turbinates b/l        Mouth/Throat:      Comments: Mucus streaking at the posterior oropharynx with mild erythema     Cardiovascular:      Rate and Rhythm: Normal rate and regular rhythm  Heart sounds: Normal heart sounds  Pulmonary:      Comments: Course breath sounds, throughout  No rales noted  Scattered wheeze    Abdominal:      General: Bowel sounds are normal  There is no distension  Palpations: Abdomen is soft  Tenderness: There is no abdominal tenderness  There is no guarding or rebound  Musculoskeletal:         General: No deformity  Cervical back: Neck supple  Lymphadenopathy:      Cervical: No cervical adenopathy  Skin:     General: Skin is warm and dry  Neurological:      General: No focal deficit present  Mental Status: She is alert and oriented to person, place, and time  Psychiatric:         Mood and Affect: Mood normal          Behavior: Behavior normal          Thought Content:  Thought content normal          Judgment: Judgment normal        Jojo St DO

## 2023-01-05 ENCOUNTER — APPOINTMENT (OUTPATIENT)
Dept: LAB | Facility: CLINIC | Age: 48
End: 2023-01-05

## 2023-01-05 DIAGNOSIS — R19.7 DIARRHEA, UNSPECIFIED TYPE: ICD-10-CM

## 2023-01-05 DIAGNOSIS — R73.03 PRE-DIABETES: ICD-10-CM

## 2023-01-05 DIAGNOSIS — R10.13 EPIGASTRIC PAIN: ICD-10-CM

## 2023-01-05 LAB
ALBUMIN SERPL BCP-MCNC: 3.7 G/DL (ref 3.5–5)
ALP SERPL-CCNC: 85 U/L (ref 46–116)
ALT SERPL W P-5'-P-CCNC: 52 U/L (ref 12–78)
ANION GAP SERPL CALCULATED.3IONS-SCNC: 6 MMOL/L (ref 4–13)
AST SERPL W P-5'-P-CCNC: 21 U/L (ref 5–45)
BASOPHILS # BLD AUTO: 0.05 THOUSANDS/ÂΜL (ref 0–0.1)
BASOPHILS NFR BLD AUTO: 1 % (ref 0–1)
BILIRUB SERPL-MCNC: 0.41 MG/DL (ref 0.2–1)
BUN SERPL-MCNC: 16 MG/DL (ref 5–25)
CALCIUM SERPL-MCNC: 8.9 MG/DL (ref 8.3–10.1)
CHLORIDE SERPL-SCNC: 105 MMOL/L (ref 96–108)
CO2 SERPL-SCNC: 25 MMOL/L (ref 21–32)
CREAT SERPL-MCNC: 0.89 MG/DL (ref 0.6–1.3)
EOSINOPHIL # BLD AUTO: 0.16 THOUSAND/ÂΜL (ref 0–0.61)
EOSINOPHIL NFR BLD AUTO: 2 % (ref 0–6)
ERYTHROCYTE [DISTWIDTH] IN BLOOD BY AUTOMATED COUNT: 14.9 % (ref 11.6–15.1)
GFR SERPL CREATININE-BSD FRML MDRD: 77 ML/MIN/1.73SQ M
GLUCOSE P FAST SERPL-MCNC: 91 MG/DL (ref 65–99)
HCT VFR BLD AUTO: 39.6 % (ref 34.8–46.1)
HGB BLD-MCNC: 12.9 G/DL (ref 11.5–15.4)
IMM GRANULOCYTES # BLD AUTO: 0.03 THOUSAND/UL (ref 0–0.2)
IMM GRANULOCYTES NFR BLD AUTO: 0 % (ref 0–2)
LIPASE SERPL-CCNC: 160 U/L (ref 73–393)
LYMPHOCYTES # BLD AUTO: 1.56 THOUSANDS/ÂΜL (ref 0.6–4.47)
LYMPHOCYTES NFR BLD AUTO: 16 % (ref 14–44)
MCH RBC QN AUTO: 29 PG (ref 26.8–34.3)
MCHC RBC AUTO-ENTMCNC: 32.6 G/DL (ref 31.4–37.4)
MCV RBC AUTO: 89 FL (ref 82–98)
MONOCYTES # BLD AUTO: 0.8 THOUSAND/ÂΜL (ref 0.17–1.22)
MONOCYTES NFR BLD AUTO: 8 % (ref 4–12)
NEUTROPHILS # BLD AUTO: 7.02 THOUSANDS/ÂΜL (ref 1.85–7.62)
NEUTS SEG NFR BLD AUTO: 73 % (ref 43–75)
NRBC BLD AUTO-RTO: 0 /100 WBCS
PLATELET # BLD AUTO: 307 THOUSANDS/UL (ref 149–390)
PMV BLD AUTO: 12 FL (ref 8.9–12.7)
POTASSIUM SERPL-SCNC: 4.3 MMOL/L (ref 3.5–5.3)
PROT SERPL-MCNC: 7.1 G/DL (ref 6.4–8.4)
RBC # BLD AUTO: 4.45 MILLION/UL (ref 3.81–5.12)
SODIUM SERPL-SCNC: 136 MMOL/L (ref 135–147)
WBC # BLD AUTO: 9.62 THOUSAND/UL (ref 4.31–10.16)

## 2023-01-06 ENCOUNTER — RA CDI HCC (OUTPATIENT)
Dept: OTHER | Facility: HOSPITAL | Age: 48
End: 2023-01-06

## 2023-01-06 NOTE — PROGRESS NOTES
[facility]  coding opportunities       Chart reviewed, no opportunity found: CHART REVIEWED, NO OPPORTUNITY FOUND      This is a reminder to address ALL HCC (risk adjustment) codes as found on active problem list for 2023 as patient scores reset to zero ANTONIO      Patients Insurance        Commercial Insurance: [employer]

## 2023-01-08 LAB
EST. AVERAGE GLUCOSE BLD GHB EST-MCNC: 123 MG/DL
HBA1C MFR BLD: 5.9 %

## 2023-01-13 ENCOUNTER — OFFICE VISIT (OUTPATIENT)
Dept: FAMILY MEDICINE CLINIC | Facility: CLINIC | Age: 48
End: 2023-01-13

## 2023-01-13 VITALS
TEMPERATURE: 98.2 F | DIASTOLIC BLOOD PRESSURE: 72 MMHG | WEIGHT: 179.4 LBS | BODY MASS INDEX: 29.85 KG/M2 | OXYGEN SATURATION: 100 % | HEART RATE: 98 BPM | SYSTOLIC BLOOD PRESSURE: 141 MMHG | RESPIRATION RATE: 18 BRPM

## 2023-01-13 DIAGNOSIS — R63.5 WEIGHT GAIN: ICD-10-CM

## 2023-01-13 DIAGNOSIS — R53.83 DECREASED ENERGY: ICD-10-CM

## 2023-01-13 DIAGNOSIS — R73.03 PRE-DIABETES: Primary | ICD-10-CM

## 2023-01-13 NOTE — PROGRESS NOTES
Name: Christy Herrera      : 1975      MRN: [id number]  Encounter Provider: Mick Oliva DO  Encounter Date: 2023   Encounter department: [address] [place] [address]  BMI 29 0-29 9,adult  Referral to PharmD for consideration for weight loss meds  She is with Pre-DM and would most certainly benefit from such  She had labs and is on board with this  She is working on her lifestyle as well  Not a candidate for agents that are stimulant in nature as she is tx'd with Adderall for ADHD  We will follow her, closely  Her goal weigh tis 135#  - Ambulatory referral to clinical pharmacy; Future    2  Pre-diabetes  Lab Results   Component Value Date    HGBA1C 5 9 (H) 2023   - see above  - Ambulatory referral to clinical pharmacy; Future    3  Weight gain  - Ambulatory referral to clinical pharmacy; Future    4  Decreased energy  - Ambulatory referral to clinical pharmacy; Future    RTC in 8w from starting weight loss agent - PharmD referral placed  Patient/Caretaker verbalized understanding and were in agreement with today's assessment and plan  Time was taken to address any questions patient/caretaker had  Indication/Risks/Benefits of medication(s) as prescribed were discussed with the patient/caretaker  The patient verbalized understanding and agreement and elects to take medications as prescribed  Time was taken to answer any questions the patient/caretaker may have had  BMI Counseling: Body mass index is 29 85 kg/m²  The BMI is above normal  Nutrition recommendations include decreasing portion sizes, encouraging healthy choices of fruits and vegetables, decreasing fast food intake, consuming healthier snacks, limiting drinks that contain sugar, reducing intake of saturated and trans fat and reducing intake of cholesterol  Exercise recommendations include exercising 3-5 times per week and strength training exercises   Rationale for BMI follow-up plan is due to patient being overweight or obese  Subjective      Overweight -- 135# is her goal weight  She is frustrated and is trying very hard with lifestyle  Despite, has pre-dm  Would like to trial GLP-1 if her insurance will cover this  She is on stimulants for ADHD, not a candidate for phentermine  No cp, sob, etc   She does have GERD and has GI appt in Feb   No bloody stools, etc     Lab Results   Component Value Date    HGBA1C 5 9 (H) 01/05/2023       Review of Systems   All other systems reviewed and are negative        Current Outpatient Medications on File Prior to Visit   Medication Sig   • amitriptyline (ELAVIL) 10 mg tablet Take 20 mg by mouth daily at bedtime   • amphetamine-dextroamphetamine (ADDERALL) 10 mg tablet Take 1 tablet by mouth every morning   • buPROPion (WELLBUTRIN XL) 300 mg 24 hr tablet Take 1 tablet (300 mg total) by mouth daily   • cholecalciferol (VITAMIN D3) 1,000 units tablet Take 2 tablets (2,000 Units total) by mouth daily   • citalopram (CeleXA) 40 mg tablet Take 40 mg by mouth every morning   • lamoTRIgine (LaMICtal) 100 mg tablet Take 100 mg by mouth every morning   • pantoprazole (PROTONIX) 40 mg tablet 1 tab po 2 x daily x 2 months then 1 tab po daily   • selenium sulfide (SELSUN) 2 5 % shampoo Apply topically daily as needed for dandruff   • Strattera 18 MG capsule Take 18 mg by mouth every morning   • SUMAtriptan (IMITREX) 100 mg tablet Take 1 tablet (100 mg total) by mouth once as needed for migraine for up to 1 dose   • zolpidem (AMBIEN) 10 mg tablet Take 10 mg by mouth every evening   • [DISCONTINUED] benzonatate (TESSALON) 200 MG capsule Take 1 capsule (200 mg total) by mouth 3 (three) times a day as needed for cough   • valACYclovir (VALTREX) 500 mg tablet Take 1 tablet (500 mg total) by mouth daily       Objective     /72 (BP Location: Left arm, Patient Position: Sitting, Cuff Size: Standard)   Pulse 98   Temp 98 2 °F (36 8 °C) (Tympanic)   Resp 18   Wt 81 4 kg (179 lb 6 4 oz)   SpO2 100%   BMI 29 85 kg/m²     Physical Exam  Vitals and nursing note reviewed  Constitutional:       Appearance: Normal appearance  HENT:      Head: Normocephalic and atraumatic  Eyes:      Conjunctiva/sclera: Conjunctivae normal       Pupils: Pupils are equal, round, and reactive to light  Cardiovascular:      Rate and Rhythm: Normal rate and regular rhythm  Heart sounds: Normal heart sounds  Pulmonary:      Effort: Pulmonary effort is normal       Breath sounds: Normal breath sounds  No wheezing, rhonchi or rales  Abdominal:      Palpations: Abdomen is soft  Musculoskeletal:         General: No deformity  Cervical back: Neck supple  Lymphadenopathy:      Cervical: No cervical adenopathy  Skin:     General: Skin is warm and dry  Neurological:      General: No focal deficit present  Mental Status: She is alert and oriented to person, place, and time  Psychiatric:         Mood and Affect: Mood normal          Behavior: Behavior normal          Thought Content:  Thought content normal          Judgment: Judgment normal        Jojo Stewart DO

## 2023-01-16 ENCOUNTER — TELEPHONE (OUTPATIENT)
Dept: MAMMOGRAPHY | Facility: CLINIC | Age: 48
End: 2023-01-16

## 2023-01-18 ENCOUNTER — TELEPHONE (OUTPATIENT)
Dept: FAMILY MEDICINE CLINIC | Facility: CLINIC | Age: 48
End: 2023-01-18

## 2023-01-18 ENCOUNTER — CLINICAL SUPPORT (OUTPATIENT)
Dept: FAMILY MEDICINE CLINIC | Facility: CLINIC | Age: 48
End: 2023-01-18

## 2023-01-18 DIAGNOSIS — R63.5 WEIGHT GAIN: ICD-10-CM

## 2023-01-18 DIAGNOSIS — R73.03 PRE-DIABETES: ICD-10-CM

## 2023-01-18 DIAGNOSIS — R53.83 DECREASED ENERGY: ICD-10-CM

## 2023-01-18 NOTE — PROGRESS NOTES
[facility]  Elizabeth, Pharmacist     Rajan Lei is a 52 y o  female who was referred to the pharmacist for obesity and weight loss education and management, referred by Emir Sheppard, DO   Telemedicine consent  The patient was identified by name and date of birth  Rajan Lei was informed that this is a telemedicine visit and that the visit is being conducted through Telephone  My office door was closed  No one else was in the room  She acknowledged consent and understanding of privacy and security of the video platform  The patient has agreed to participate and understands they can discontinue the visit at any time  Assessment/ Plan      1  Obesity   • Baseline Weight: 179 lbs   • Most recent Weight:180 lbs   • 5% weight loss goal (to be met at week 12): 171 lbs  • Weight loss: Stage 2 - BMI > 25 with at least one severe complication or requires more aggressive weight loss for effective treatment - add pharmacotherapy with BMI > 27, consider bariatric surgery with BMI > 35 per the AACE/ACE guidelines  • Patient's weight-related disease of complication: prediabetes  • Medication options/discussion  o Avoid phentermine due to contraindication with Adderall   o Avoid contrave since patient already on bupropion for depression   o GLP-1 agonist would be best option for weight loss plus additional benefit for blood sugar control due to hx prediabetes     Changes to Medication Regimen: If PCP is in agreement with plan  Initiate Semaglutide [facility]): Titration schedule - 0 25 mg weekly x4 weeks, then 0 5 mg weekly x4 weeks, then 1 mg weekly x4 weeks, then 1 7 mg weekly x4 weeks, then 2 4 mg weekly thereafter as tolerated - If the patient cannot tolerate an increased dose during dose escalation, consider delaying dose escalation for one additional month      A 5% weight loss goal with Fay Lal is recommended at 12 weeks per AACE/ACE guidelines    If goal is not reached, medication should be discontinued    2  Medication Reconciliation:   • Medication list reviewed with pt at today's visit  • Medication list updated to reflect medications pt is currently taking    3  BMI Counseling There is no height or weight on file to calculate BMI  The BMI is above normal  Nutrition recommendations include reducing portion sizes, decreasing overall calorie intake and 3-5 servings of fruits/vegetables daily  Exercise recommendations include moderate aerobic physical activity for 150 minutes/week  Pharmacotherapy was ordered for patient to aid in weight loss  Monitoring: weight on home scale     Referrals placed at this visit: None     Follow-up: 4 weeks       Subjective        1  Previous weight loss medication  • Ortiz OTC- lost some weight but also had digestive side effects and acid reflux    2  Lifestyle:   • Has met with and worked with dietician  Used my plate method, portion control, keto and low carb  Also tried shake as meal replacement  Also tried weight watchers  • Physical Activity: Tries to walk but does get SOB and tired quickly  Even gets short of breath walking up and down steps   Has special needs  Objective       ASCVD Risk:  The 10-year ASCVD risk score (Brandon SUÁREZ, et al , 2019) is: 1%    Values used to calculate the score:      Age: 52 years      Sex: Female      Is Non- : No      Diabetic: No      Tobacco smoker: No      Systolic Blood Pressure: 248 mmHg      Is BP treated: No      HDL Cholesterol: 48 mg/dL      Total Cholesterol: 163 mg/dL     Vitals:     Wt Readings from Last 5 Encounters:   01/13/23 81 4 kg (179 lb 6 4 oz)   12/15/22 83 5 kg (184 lb)   12/09/22 81 1 kg (178 lb 12 8 oz)   07/18/22 81 8 kg (180 lb 4 8 oz)   07/14/22 83 1 kg (183 lb 3 2 oz)       BP Readings from Last 3 Encounters:   01/13/23 141/72   12/15/22 151/80   12/09/22 126/77       Pulse Readings from Last 3 Encounters:   01/13/23 98   12/15/22 97 12/09/22 (!) 109       Labs:  Lab Results   Component Value Date    SODIUM 136 01/05/2023    K 4 3 01/05/2023     01/05/2023    CO2 25 01/05/2023    AGAP 6 01/05/2023    BUN 16 01/05/2023    CREATININE 0 89 01/05/2023    GLUC 105 02/17/2022    GLUF 91 01/05/2023    CALCIUM 8 9 01/05/2023    AST 21 01/05/2023    ALT 52 01/05/2023    ALKPHOS 85 01/05/2023    TP 7 1 01/05/2023    TBILI 0 41 01/05/2023    EGFR 77 01/05/2023         Pharmacist Tracking Tool       Pharmacist Tracking Tool  Reason For Outreach: Embedded Pharmacist  Demographics:  Intervention Method: Phone  Type of Intervention: New  Topics Addressed: Obesity  Pharmacologic Interventions: Medication Initiation  Non-Pharmacologic Interventions: Disease state education and Medication/Device education  Time:  Direct Patient Care: 30 mins  Care Coordination: 15 mins  Recommendation Recipient: Patient/Caregiver and Provider  Outcome: Accepted

## 2023-01-18 NOTE — TELEPHONE ENCOUNTER
Prior auth for [employer] completed through cover my meds   Key: [facility]    Pharmacist Tracking Tool  Reason For Outreach: Embedded Pharmacist  Demographics:  Intervention Method: Chart Review  Type of Intervention: Follow-Up  Topics Addressed: Obesity  Pharmacologic Interventions: N/A  Non-Pharmacologic Interventions: Chart update  Time:  Direct Patient Care: 0 mins  Care Coordination: 15 mins  Recommendation Recipient: N/A  Outcome: N/A

## 2023-02-15 ENCOUNTER — CLINICAL SUPPORT (OUTPATIENT)
Dept: FAMILY MEDICINE CLINIC | Facility: CLINIC | Age: 48
End: 2023-02-15

## 2023-02-15 ENCOUNTER — PATIENT MESSAGE (OUTPATIENT)
Dept: FAMILY MEDICINE CLINIC | Facility: CLINIC | Age: 48
End: 2023-02-15

## 2023-02-15 DIAGNOSIS — B37.31 VAGINAL CANDIDA: Primary | ICD-10-CM

## 2023-02-15 RX ORDER — BUPROPION HYDROCHLORIDE 200 MG/1
200 TABLET, EXTENDED RELEASE ORAL 2 TIMES DAILY
Qty: 180 TABLET | Refills: 1 | Status: SHIPPED | OUTPATIENT
Start: 2023-02-15

## 2023-02-15 RX ORDER — NALTREXONE HYDROCHLORIDE 50 MG/1
TABLET, FILM COATED ORAL
Qty: 30 TABLET | Refills: 1 | Status: SHIPPED | OUTPATIENT
Start: 2023-02-15 | End: 2023-03-17

## 2023-02-15 NOTE — PROGRESS NOTES
[facility]  Services  Elizabeth Pharmacist     Justin Low is a 52 y o  female who was referred to the pharmacist for obesity and weight loss education and management, referred by Tyler Guerra DO   Telemedicine consent  The patient was identified by name and date of birth  Justin Low was informed that this is a telemedicine visit and that the visit is being conducted through Telephone  My office door was closed  No one else was in the room  She acknowledged consent and understanding of privacy and security of the video platform  The patient has agreed to participate and understands they can discontinue the visit at any time  Assessment/ Plan      1  Obesity   • Baseline Weight: 179 lbs   • Most recent Weight:185 lbs   • 5% weight loss goal (to be met at week 12): 175 lbs  • Weight loss: Stage 2 - BMI > 25 with at least one severe complication or requires more aggressive weight loss for effective treatment - add pharmacotherapy with BMI > 27, consider bariatric surgery with BMI > 35 per the AACE/ACE guidelines  • Patient's weight-related disease of complication: prediabetes  • Medication options/discussion  o Already on bupropion  Discussed doing individual components of Contrave  No seizure hx  No current opioid use per review of med list and discussion with patient  LFTs and renal function within normal range   o Avoid phentermine due to contraindication with Adderall   o GLP-1 agonist would be best option for weight loss plus additional benefit for blood sugar control due to hx prediabetes  Unfortunately insurance denied authorization    Changes to Medication Regimen: If PCP is in agreement with plan  · Increase bupropion dose to 200 mg BID (previously 300 mg daily)  · Initiate naltrexone 25 mg daily x2 weeks then 50 mg daily  A 5% weight loss goal with contrave is recommended at 12 weeks per AACE/ACE guidelines    If goal is not reached, medication should be discontinued    2  Medication Reconciliation:   • Medication list reviewed with pt at today's visit  • Medication list updated to reflect medications pt is currently taking    3  BMI Counseling There is no height or weight on file to calculate BMI  The BMI is above normal  Nutrition recommendations include reducing portion sizes, decreasing overall calorie intake and 3-5 servings of fruits/vegetables daily  Exercise recommendations include moderate aerobic physical activity for 150 minutes/week  Pharmacotherapy was ordered for patient to aid in weight loss  Monitoring: weight on home scale     Referrals placed at this visit: None     Follow-up: 4 weeks       Subjective        1  Previous weight loss medication  • Ortiz OTC- lost some weight but also had digestive side effects and acid reflux    2  Lifestyle:   • Has met with and worked with dietician  Used my plate method, portion control, keto and low carb  Also tried shake as meal replacement  Also tried weight watchers  • Physical Activity: Tries to walk but does get SOB and tired quickly  Even gets short of breath walking up and down steps   Has special needs  Objective       ASCVD Risk:  The 10-year ASCVD risk score (Brandon SUÁREZ, et al , 2019) is: 1%    Values used to calculate the score:      Age: 52 years      Sex: Female      Is Non- : No      Diabetic: No      Tobacco smoker: No      Systolic Blood Pressure: 585 mmHg      Is BP treated: No      HDL Cholesterol: 48 mg/dL      Total Cholesterol: 163 mg/dL     Vitals:     Wt Readings from Last 5 Encounters:   01/13/23 81 4 kg (179 lb 6 4 oz)   12/15/22 83 5 kg (184 lb)   12/09/22 81 1 kg (178 lb 12 8 oz)   07/18/22 81 8 kg (180 lb 4 8 oz)   07/14/22 83 1 kg (183 lb 3 2 oz)       BP Readings from Last 3 Encounters:   01/13/23 141/72   12/15/22 151/80   12/09/22 126/77       Pulse Readings from Last 3 Encounters:   01/13/23 98   12/15/22 97   12/09/22 (!) 109 Labs:  Lab Results   Component Value Date    SODIUM 136 01/05/2023    K 4 3 01/05/2023     01/05/2023    CO2 25 01/05/2023    AGAP 6 01/05/2023    BUN 16 01/05/2023    CREATININE 0 89 01/05/2023    GLUC 105 02/17/2022    GLUF 91 01/05/2023    CALCIUM 8 9 01/05/2023    AST 21 01/05/2023    ALT 52 01/05/2023    ALKPHOS 85 01/05/2023    TP 7 1 01/05/2023    TBILI 0 41 01/05/2023    EGFR 77 01/05/2023         Pharmacist Tracking Tool       Pharmacist Tracking Tool  Reason For Outreach: Embedded Pharmacist  Demographics:  Intervention Method: Phone  Type of Intervention: Follow-Up  Topics Addressed: Obesity  Pharmacologic Interventions: Medication Initiation, Medication Discontinuation and Dose or Frequency Adjusted  Non-Pharmacologic Interventions: Disease state education and Medication/Device education  Time:  Direct Patient Care: 30 mins  Care Coordination: 15 mins  Recommendation Recipient: Patient/Caregiver and Provider  Outcome: Accepted

## 2023-02-16 RX ORDER — FLUCONAZOLE 150 MG/1
150 TABLET ORAL ONCE
Qty: 2 TABLET | Refills: 0 | Status: SHIPPED | OUTPATIENT
Start: 2023-02-16 | End: 2023-02-16

## 2023-03-20 ENCOUNTER — CLINICAL SUPPORT (OUTPATIENT)
Dept: FAMILY MEDICINE CLINIC | Facility: CLINIC | Age: 48
End: 2023-03-20

## 2023-03-20 NOTE — PROGRESS NOTES
[facility]  Services  Nathan Johnson     Oswaldo Reyna is a 52 y o  female who was referred to the pharmacist for obesity and weight loss education and management, referred by Jorge A Ellis DO   Telemedicine consent  The patient was identified by name and date of birth  Oswaldo Reyna was informed that this is a telemedicine visit and that the visit is being conducted through Telephone  My office door was closed  No one else was in the room  She acknowledged consent and understanding of privacy and security of the video platform  The patient has agreed to participate and understands they can discontinue the visit at any time  Assessment/ Plan      1  Obesity   • Baseline Weight: 185 lbs   • Most recent Weight: 183 lbs   • 5% weight loss goal (to be met at week 12): 175 lbs  • Weight loss: Stage 2 - BMI > 25 with at least one severe complication or requires more aggressive weight loss for effective treatment - add pharmacotherapy with BMI > 27, consider bariatric surgery with BMI > 35 per the AACE/ACE guidelines  • Patient's weight-related disease of complication: prediabetes    Changes to Medication Regimen: If PCP is in agreement with plan  · Continue bupropion 200 mg BID continue naltrexone 25 mg daily and increase to 50 mg daily at the end of this week  A 5% weight loss goal with contrave is recommended at 12 weeks per AACE/ACE guidelines  If goal is not reached, medication should be discontinued    2  Medication Reconciliation:   • Medication list reviewed with pt at today's visit  • Medication list updated to reflect medications pt is currently taking    3  BMI Counseling There is no height or weight on file to calculate BMI  The BMI is above normal  Nutrition recommendations include reducing portion sizes, decreasing overall calorie intake and 3-5 servings of fruits/vegetables daily   Exercise recommendations include moderate aerobic physical activity for 150 minutes/week  Pharmacotherapy was ordered for patient to aid in weight loss  Monitoring: weight on home scale     Referrals placed at this visit: None     Follow-up: 5 weeks via mychart    Subjective        1  Current medication  · Bupropion 200 mg BID + Naltrexone 25 mg daily (increases at the end of this week to 50 mg daily)  Has been on this combination for ~ 1 5 weeks now  Denies side effects at this time  2  Previous weight loss medication  • Ortiz OTC- lost some weight but also had digestive side effects and acid reflux    2  Lifestyle:   • Decreased appetite and smaller portion sizes since starting medication bupropion + naltrexone combination  • Has met with and worked with dietician  Used my plate method, portion control, keto and low carb  Also tried shake as meal replacement  Also tried weight watchers  • Physical Activity: Tries to walk but does get SOB and tired quickly  Even gets short of breath walking up and down steps   Has a child with special needs which makes finding time to exercise difficult  Objective       ASCVD Risk:  The 10-year ASCVD risk score (Brandon SUÁREZ, et al , 2019) is: 1%    Values used to calculate the score:      Age: 52 years      Sex: Female      Is Non- : No      Diabetic: No      Tobacco smoker: No      Systolic Blood Pressure: 969 mmHg      Is BP treated: No      HDL Cholesterol: 48 mg/dL      Total Cholesterol: 163 mg/dL     Vitals:     Wt Readings from Last 5 Encounters:   01/13/23 81 4 kg (179 lb 6 4 oz)   12/15/22 83 5 kg (184 lb)   12/09/22 81 1 kg (178 lb 12 8 oz)   07/18/22 81 8 kg (180 lb 4 8 oz)   07/14/22 83 1 kg (183 lb 3 2 oz)       BP Readings from Last 3 Encounters:   01/13/23 141/72   12/15/22 151/80   12/09/22 126/77       Pulse Readings from Last 3 Encounters:   01/13/23 98   12/15/22 97   12/09/22 (!) 109     Home weight  · 2/15/23: 185 lbs  · 3/20/23: 183 lbs    Labs:  Lab Results   Component Value Date SODIUM 136 01/05/2023    K 4 3 01/05/2023     01/05/2023    CO2 25 01/05/2023    AGAP 6 01/05/2023    BUN 16 01/05/2023    CREATININE 0 89 01/05/2023    GLUC 105 02/17/2022    GLUF 91 01/05/2023    CALCIUM 8 9 01/05/2023    AST 21 01/05/2023    ALT 52 01/05/2023    ALKPHOS 85 01/05/2023    TP 7 1 01/05/2023    TBILI 0 41 01/05/2023    EGFR 77 01/05/2023         Pharmacist Tracking Tool       Pharmacist Tracking Tool  Reason For Outreach: Embedded Pharmacist  Demographics:  Intervention Method: Phone  Type of Intervention: Follow-Up  Topics Addressed: Obesity  Pharmacologic Interventions: N/A  Non-Pharmacologic Interventions: Disease state education and Medication/Device education  Time:  Direct Patient Care: 10 mins  Care Coordination: 10 mins  Recommendation Recipient: Patient/Caregiver and Provider  Outcome: Accepted

## 2023-04-04 ENCOUNTER — VBI (OUTPATIENT)
Dept: ADMINISTRATIVE | Facility: OTHER | Age: 48
End: 2023-04-04

## 2023-04-24 ENCOUNTER — TELEPHONE (OUTPATIENT)
Dept: FAMILY MEDICINE CLINIC | Facility: CLINIC | Age: 48
End: 2023-04-24

## 2023-05-25 ENCOUNTER — APPOINTMENT (OUTPATIENT)
Dept: URGENT CARE | Facility: CLINIC | Age: 48
End: 2023-05-25

## 2023-05-25 ENCOUNTER — OFFICE VISIT (OUTPATIENT)
Dept: URGENT CARE | Facility: CLINIC | Age: 48
End: 2023-05-25

## 2023-05-25 VITALS
DIASTOLIC BLOOD PRESSURE: 64 MMHG | OXYGEN SATURATION: 100 % | BODY MASS INDEX: 29.63 KG/M2 | HEIGHT: 66 IN | SYSTOLIC BLOOD PRESSURE: 135 MMHG | WEIGHT: 184.4 LBS | HEART RATE: 80 BPM | RESPIRATION RATE: 20 BRPM | TEMPERATURE: 98.2 F

## 2023-05-25 DIAGNOSIS — J01.11 ACUTE RECURRENT FRONTAL SINUSITIS: Primary | ICD-10-CM

## 2023-05-25 RX ORDER — AMOXICILLIN AND CLAVULANATE POTASSIUM 875; 125 MG/1; MG/1
1 TABLET, FILM COATED ORAL EVERY 12 HOURS SCHEDULED
Qty: 14 TABLET | Refills: 0 | Status: SHIPPED | OUTPATIENT
Start: 2023-05-25 | End: 2023-06-01

## 2023-05-25 NOTE — PATIENT INSTRUCTIONS
Sinusitis   WHAT YOU NEED TO KNOW:   Sinusitis is inflammation or infection of your sinuses  Sinusitis is most often caused by a virus  Acute sinusitis may last up to 12 weeks  Chronic sinusitis lasts longer than 12 weeks  Recurrent sinusitis means you have 4 or more infections in 1 year  DISCHARGE INSTRUCTIONS:   Return to the emergency department if:   You have trouble breathing or wheezing that is getting worse  You have a stiff neck, a fever, or a bad headache  You cannot open your eye  Your eyeball bulges out or you cannot move your eye  You are more sleepy than normal, or you notice changes in your ability to think, move, or talk  You have swelling of your forehead or scalp  Call your doctor if:   You have vision changes, such as double vision  Your eye and eyelid are red, swollen, and painful  Your symptoms do not improve or go away after 10 days  You have nausea and are vomiting  Your nose is bleeding  You have questions or concerns about your condition or care  Medicines: Your symptoms may go away on their own  Your healthcare provider may recommend watchful waiting for up to 10 days before starting antibiotics  You may need any of the following:  Acetaminophen  decreases pain and fever  It is available without a doctor's order  Ask how much to take and how often to take it  Follow directions  Read the labels of all other medicines you are using to see if they also contain acetaminophen, or ask your doctor or pharmacist  Acetaminophen can cause liver damage if not taken correctly  NSAIDs , such as ibuprofen, help decrease swelling, pain, and fever  This medicine is available with or without a doctor's order  NSAIDs can cause stomach bleeding or kidney problems in certain people  If you take blood thinner medicine, always ask your healthcare provider if NSAIDs are safe for you  Always read the medicine label and follow directions      Nasal steroid sprays may help decrease inflammation in your nose and sinuses  Decongestants  help reduce swelling and drain mucus in the nose and sinuses  They may help you breathe easier  Antihistamines  help dry mucus in the nose and relieve sneezing  Antibiotics  help treat or prevent a bacterial infection  Take your medicine as directed  Contact your healthcare provider if you think your medicine is not helping or if you have side effects  Tell your provider if you are allergic to any medicine  Keep a list of the medicines, vitamins, and herbs you take  Include the amounts, and when and why you take them  Bring the list or the pill bottles to follow-up visits  Carry your medicine list with you in case of an emergency  Self-care:   Rinse your sinuses as directed  Use a sinus rinse device to rinse your nasal passages with a saline (salt water) solution or distilled water  Do not use tap water  This will help thin the mucus in your nose and rinse away pollen and dirt  It will also help reduce swelling so you can breathe normally  Use a humidifier  to increase air moisture in your home  This may make it easier for you to breathe and help decrease your cough  Sleep with your head elevated  Place an extra pillow under your head before you go to sleep to help your sinuses drain  Drink liquids as directed  Ask your healthcare provider how much liquid to drink each day and which liquids are best for you  Liquids will thin the mucus in your nose and help it drain  Avoid drinks that contain alcohol or caffeine  Do not smoke, and avoid secondhand smoke  Nicotine and other chemicals in cigarettes and cigars can make your symptoms worse  Ask your healthcare provider for information if you currently smoke and need help to quit  E-cigarettes or smokeless tobacco still contain nicotine  Talk to your healthcare provider before you use these products      Prevent the spread of germs:   Wash your hands often with soap and water  Wash your hands after you use the bathroom, change a child's diaper, or sneeze  Wash your hands before you prepare or eat food  Stay away from people who are sick  Some germs spread easily and quickly through contact  Follow up with your doctor as directed: You may be referred to an ear, nose, and throat specialist  Write down your questions so you remember to ask them during your visits  © Copyright Alexandr Carla 2022 Information is for End User's use only and may not be sold, redistributed or otherwise used for commercial purposes  The above information is an  only  It is not intended as medical advice for individual conditions or treatments  Talk to your doctor, nurse or pharmacist before following any medical regimen to see if it is safe and effective for you

## 2023-05-25 NOTE — PROGRESS NOTES
[id number]        NAME: Tiffany Taylor is a 52 y o  female  : 1975    MRN: [id number]  DATE: May 25, 2023  TIME: 3:43 PM    Assessment and Plan   Acute recurrent frontal sinusitis [J01 11]  1  Acute recurrent frontal sinusitis  amoxicillin-clavulanate (AUGMENTIN) 875-125 mg per tablet            Patient Instructions   Patient Instructions     Sinusitis   WHAT YOU NEED TO KNOW:   Sinusitis is inflammation or infection of your sinuses  Sinusitis is most often caused by a virus  Acute sinusitis may last up to 12 weeks  Chronic sinusitis lasts longer than 12 weeks  Recurrent sinusitis means you have 4 or more infections in 1 year  DISCHARGE INSTRUCTIONS:   Return to the emergency department if:   • You have trouble breathing or wheezing that is getting worse  • You have a stiff neck, a fever, or a bad headache  • You cannot open your eye  • Your eyeball bulges out or you cannot move your eye  • You are more sleepy than normal, or you notice changes in your ability to think, move, or talk  • You have swelling of your forehead or scalp  Call your doctor if:   • You have vision changes, such as double vision  • Your eye and eyelid are red, swollen, and painful  • Your symptoms do not improve or go away after 10 days  • You have nausea and are vomiting  • Your nose is bleeding  • You have questions or concerns about your condition or care  Medicines: Your symptoms may go away on their own  Your healthcare provider may recommend watchful waiting for up to 10 days before starting antibiotics  You may need any of the following:  • Acetaminophen  decreases pain and fever  It is available without a doctor's order  Ask how much to take and how often to take it  Follow directions   Read the labels of all other medicines you are using to see if they also contain acetaminophen, or ask your doctor or pharmacist  Acetaminophen can cause liver damage if not taken correctly  • NSAIDs , such as ibuprofen, help decrease swelling, pain, and fever  This medicine is available with or without a doctor's order  NSAIDs can cause stomach bleeding or kidney problems in certain people  If you take blood thinner medicine, always ask your healthcare provider if NSAIDs are safe for you  Always read the medicine label and follow directions  • Nasal steroid sprays  may help decrease inflammation in your nose and sinuses  • Decongestants  help reduce swelling and drain mucus in the nose and sinuses  They may help you breathe easier  • Antihistamines  help dry mucus in the nose and relieve sneezing  • Antibiotics  help treat or prevent a bacterial infection  • Take your medicine as directed  Contact your healthcare provider if you think your medicine is not helping or if you have side effects  Tell your provider if you are allergic to any medicine  Keep a list of the medicines, vitamins, and herbs you take  Include the amounts, and when and why you take them  Bring the list or the pill bottles to follow-up visits  Carry your medicine list with you in case of an emergency  Self-care:   • Rinse your sinuses as directed  Use a sinus rinse device to rinse your nasal passages with a saline (salt water) solution or distilled water  Do not use tap water  This will help thin the mucus in your nose and rinse away pollen and dirt  It will also help reduce swelling so you can breathe normally  • Use a humidifier  to increase air moisture in your home  This may make it easier for you to breathe and help decrease your cough  • Sleep with your head elevated  Place an extra pillow under your head before you go to sleep to help your sinuses drain  • Drink liquids as directed  Ask your healthcare provider how much liquid to drink each day and which liquids are best for you  Liquids will thin the mucus in your nose and help it drain   Avoid drinks that contain alcohol or caffeine  • Do not smoke, and avoid secondhand smoke  Nicotine and other chemicals in cigarettes and cigars can make your symptoms worse  Ask your healthcare provider for information if you currently smoke and need help to quit  E-cigarettes or smokeless tobacco still contain nicotine  Talk to your healthcare provider before you use these products  Prevent the spread of germs:   • Wash your hands often with soap and water  Wash your hands after you use the bathroom, change a child's diaper, or sneeze  Wash your hands before you prepare or eat food  • Stay away from people who are sick  Some germs spread easily and quickly through contact  Follow up with your doctor as directed: You may be referred to an ear, nose, and throat specialist  Write down your questions so you remember to ask them during your visits  © Copyright Mirza Ladd 2022 Information is for End User's use only and may not be sold, redistributed or otherwise used for commercial purposes  The above information is an  only  It is not intended as medical advice for individual conditions or treatments  Talk to your doctor, nurse or pharmacist before following any medical regimen to see if it is safe and effective for you  Follow up with PCP in 3-5 days  Proceed to  ER if symptoms worsen  Chief Complaint     Chief Complaint   Patient presents with   • Earache     Had a cold 3 weeks ago, felt like she was improving but the ears are very painful, and still has a cough  Ear pain and right sided pain in the face           History of Present Illness       The patient states that she had URI symptoms approximately 3 weeks ago  She states she now fullness of her right ear as well as sinus pressure and pain behind her right eye  Review of Systems   Review of Systems   Constitutional: Negative for chills and fever  HENT: Positive for congestion, ear pain, postnasal drip and rhinorrhea   Negative for sinus pressure, sore throat, tinnitus, trouble swallowing and voice change  Eyes: Negative for pain and visual disturbance  Respiratory: Positive for cough  Negative for shortness of breath  Cardiovascular: Negative for chest pain and palpitations  Gastrointestinal: Negative for abdominal pain, blood in stool, constipation, diarrhea, nausea and vomiting  Genitourinary: Negative for dysuria and hematuria  Musculoskeletal: Negative for arthralgias and back pain  Skin: Negative for color change and rash  Neurological: Positive for headaches  Negative for seizures and syncope  All other systems reviewed and are negative          Current Medications       Current Outpatient Medications:   •  amitriptyline (ELAVIL) 10 mg tablet, Take 20 mg by mouth daily at bedtime, Disp: , Rfl:   •  amoxicillin-clavulanate (AUGMENTIN) 875-125 mg per tablet, Take 1 tablet by mouth every 12 (twelve) hours for 7 days, Disp: 14 tablet, Rfl: 0  •  amphetamine-dextroamphetamine (ADDERALL) 10 mg tablet, Take 1 tablet by mouth every morning, Disp: , Rfl:   •  buPROPion (Wellbutrin SR) 200 MG 12 hr tablet, Take 1 tablet (200 mg total) by mouth 2 (two) times a day, Disp: 180 tablet, Rfl: 1  •  cholecalciferol (VITAMIN D3) 1,000 units tablet, Take 2 tablets (2,000 Units total) by mouth daily, Disp: 60 tablet, Rfl: 5  •  citalopram (CeleXA) 40 mg tablet, Take 40 mg by mouth every morning, Disp: , Rfl:   •  lamoTRIgine (LaMICtal) 100 mg tablet, Take 100 mg by mouth every morning, Disp: , Rfl:   •  pantoprazole (PROTONIX) 40 mg tablet, 1 tab po 2 x daily x 2 months then 1 tab po daily, Disp: 60 tablet, Rfl: 5  •  selenium sulfide (SELSUN) 2 5 % shampoo, Apply topically daily as needed for dandruff, Disp: 118 mL, Rfl: 5  •  Strattera 18 MG capsule, Take 18 mg by mouth every morning, Disp: , Rfl:   •  SUMAtriptan (IMITREX) 100 mg tablet, Take 1 tablet (100 mg total) by mouth once as needed for migraine for up to 1 dose, Disp: 12 tablet, Rfl: 5  •  zolpidem (AMBIEN) 10 mg tablet, Take 10 mg by mouth every evening, Disp: , Rfl:   •  naltrexone (REVIA) 50 mg tablet, Take 0 5 tablets (25 mg total) by mouth daily for 14 days, THEN 1 tablet (50 mg total) daily for 16 days  , Disp: 30 tablet, Rfl: 1  •  valACYclovir (VALTREX) 500 mg tablet, Take 1 tablet (500 mg total) by mouth daily, Disp: 30 tablet, Rfl: 5    Current Allergies     Allergies as of 05/25/2023 - Reviewed 05/25/2023   Allergen Reaction Noted   • Iodine - food allergy Other (See Comments) 04/23/2019   • Zofran [ondansetron] Itching 07/11/2022            The following portions of the patient's history were reviewed and updated as appropriate: allergies, current medications, past family history, past medical history, past social history, past surgical history and problem list      Past Medical History:   Diagnosis Date   • ADHD    • Anxiety and depression    • Diabetes mellitus during pregnancy    • Herpes labialis     Resolved 6/1/2016        Past Surgical History:   Procedure Laterality Date   • TONSILLECTOMY AND ADENOIDECTOMY         Family History   Problem Relation Age of Onset   • Anxiety disorder Mother         NOS   • Depression Mother    • Diabetes Father    • No Known Problems Sister    • No Known Problems Sister    • No Known Problems Daughter    • No Known Problems Daughter    • No Known Problems Daughter    • No Known Problems Maternal Grandmother    • No Known Problems Maternal Grandfather    • No Known Problems Paternal Grandmother    • No Known Problems Paternal Grandfather    • Diabetes Brother    • Chromosomal disorder Son    • No Known Problems Maternal Aunt    • No Known Problems Paternal Aunt    • No Known Problems Paternal Aunt    • No Known Problems Paternal Aunt    • No Known Problems Paternal Aunt    • No Known Problems Paternal Aunt    • No Known Problems Paternal Aunt    • No Known Problems Paternal Aunt    • No Known Problems Paternal Aunt    • No Known Problems "Paternal Aunt    • No Known Problems Paternal Aunt    • No Known Problems Paternal Aunt    • No Known Problems Paternal Aunt    • No Known Problems Paternal Aunt    • No Known Problems Paternal Aunt    • Breast cancer Neg Hx    • BRCA1 Positive Neg Hx    • BRCA1 Negative Neg Hx    • Ovarian cancer Neg Hx    • BRCA 1/2 Neg Hx    • Endometrial cancer Neg Hx    • Colon cancer Neg Hx    • Breast cancer additional onset Neg Hx    • BRCA2 Negative Neg Hx    • BRCA2 Positive Neg Hx          Medications have been verified  Objective   /64   Pulse 80   Temp 98 2 °F (36 8 °C)   Resp 20   Ht 5' 6\" (1 676 m)   Wt 83 6 kg (184 lb 6 4 oz)   SpO2 100%   BMI 29 76 kg/m²        Physical Exam     Physical Exam  Constitutional:       Appearance: She is well-developed  She is not diaphoretic  HENT:      Head: Normocephalic  Right Ear: Tympanic membrane normal       Left Ear: Tympanic membrane normal       Nose: Congestion and rhinorrhea present  Comments: Nasal congestion is noted with sinus tenderness  Mouth/Throat:      Pharynx: No posterior oropharyngeal erythema  Eyes:      General:         Right eye: No discharge  Left eye: No discharge  Pupils: Pupils are equal, round, and reactive to light  Neck:      Thyroid: No thyromegaly  Cardiovascular:      Rate and Rhythm: Normal rate  Heart sounds: No murmur heard  Pulmonary:      Effort: Pulmonary effort is normal  No respiratory distress  Breath sounds: No wheezing or rales  Chest:      Chest wall: No tenderness  Abdominal:      General: There is no distension  Palpations: Abdomen is soft  Tenderness: There is no abdominal tenderness  There is no guarding or rebound  Musculoskeletal:         General: Normal range of motion  Cervical back: Normal range of motion  Lymphadenopathy:      Cervical: No cervical adenopathy  Skin:     General: Skin is warm     Neurological:      Mental Status: She is " alert and oriented to person, place, and time

## 2023-06-26 DIAGNOSIS — G43.809 OTHER MIGRAINE WITHOUT STATUS MIGRAINOSUS, NOT INTRACTABLE: ICD-10-CM

## 2023-06-26 DIAGNOSIS — B00.1 COLD SORE: ICD-10-CM

## 2023-06-27 RX ORDER — VALACYCLOVIR HYDROCHLORIDE 500 MG/1
500 TABLET, FILM COATED ORAL DAILY
Qty: 30 TABLET | Refills: 0 | Status: SHIPPED | OUTPATIENT
Start: 2023-06-27 | End: 2023-07-27

## 2023-06-27 RX ORDER — SUMATRIPTAN 100 MG/1
100 TABLET, FILM COATED ORAL ONCE AS NEEDED
Qty: 12 TABLET | Refills: 0 | Status: SHIPPED | OUTPATIENT
Start: 2023-06-27

## 2023-07-10 ENCOUNTER — TELEPHONE (OUTPATIENT)
Dept: RADIOLOGY | Facility: CLINIC | Age: 48
End: 2023-07-10

## 2023-07-13 ENCOUNTER — OFFICE VISIT (OUTPATIENT)
Dept: FAMILY MEDICINE CLINIC | Facility: CLINIC | Age: 48
End: 2023-07-13
Payer: COMMERCIAL

## 2023-07-13 VITALS
DIASTOLIC BLOOD PRESSURE: 68 MMHG | WEIGHT: 178.2 LBS | BODY MASS INDEX: 28.76 KG/M2 | RESPIRATION RATE: 18 BRPM | OXYGEN SATURATION: 98 % | SYSTOLIC BLOOD PRESSURE: 132 MMHG | HEART RATE: 96 BPM | TEMPERATURE: 98 F

## 2023-07-13 DIAGNOSIS — R06.83 SNORING: ICD-10-CM

## 2023-07-13 DIAGNOSIS — R40.0 DAYTIME SOMNOLENCE: ICD-10-CM

## 2023-07-13 DIAGNOSIS — E66.3 OVERWEIGHT (BMI 25.0-29.9): ICD-10-CM

## 2023-07-13 DIAGNOSIS — R73.03 PRE-DIABETES: ICD-10-CM

## 2023-07-13 DIAGNOSIS — F32.5 DEPRESSION, MAJOR, IN REMISSION (HCC): ICD-10-CM

## 2023-07-13 DIAGNOSIS — F41.9 ANXIETY: ICD-10-CM

## 2023-07-13 DIAGNOSIS — Z00.00 ANNUAL PHYSICAL EXAM: Primary | ICD-10-CM

## 2023-07-13 PROCEDURE — 99396 PREV VISIT EST AGE 40-64: CPT | Performed by: FAMILY MEDICINE

## 2023-07-13 NOTE — PROGRESS NOTES
[facility] PRACTICE    NAME: Debi Mcginnis  AGE: 52 y.o. SEX: female  : 1975     DATE: 2023     Assessment and Plan:     1. Snoring  - she is with difficulty losing weight/significant snoring/daytime fatigue. Will send back to Sleep Clinic for consideration for sleep study. She tells me her insurance denied in past.  She has thick neck and I feel she needs to be tested, likely positive. - Ambulatory Referral to Sleep Medicine; Future    2. Daytime somnolence  - Ambulatory Referral to Sleep Medicine; Future    3. Annual physical exam  Will have Mammo coming up  [facility]  Women's health -- sees Gyn  [facility]  Discussed the importance of maintaining a healthy lifestyle through heart healthy diet and exercise. 4. Overweight (BMI 25.0-29. 9)  On natrexone and wellbutrin and is losing weight. She is tolerating this. Feels better. GLP-1 was not covered. Praised for her work and encouraged to keep up the good work. 5. Pre-diabetes  Lab Results   Component Value Date    HGBA1C 5.9 (H) 2023   will recheck at one year - on a weight loss journey    6. Anxiety  Stable. 7. Depression, major, in remission ([address])  Stable. Cont regimen. No si/hi        Immunizations and preventive care screenings were discussed with patient today. Appropriate education was printed on patient's after visit summary. Counseling:  Alcohol/drug use: discussed moderation in alcohol intake, the recommendations for healthy alcohol use, and avoidance of illicit drug use. Dental Health: discussed importance of regular tooth brushing, flossing, and dental visits. Injury prevention: discussed safety/seat belts, safety helmets, smoke detectors, carbon dioxide detectors, and smoking near bedding or upholstery.   Sexual health: discussed sexually transmitted diseases, partner selection, use of condoms, avoidance of unintended pregnancy, and contraceptive alternatives. · Exercise: the importance of regular exercise/physical activity was discussed. Recommend exercise 3-5 times per week for at least 30 minutes. Return in about 3 months (around 10/13/2023) for f/u weight management . Chief Complaint:     Chief Complaint   Patient presents with   • Physical Exam      History of Present Illness:     Adult Annual Physical   Patient here for a comprehensive physical exam. The patient reports:    PharmD was on board and Wellbutrin and Naltrexone started -- doing well with this and losing weight. She feels good, otherwise. Mood is stable. Taking meds and tolerating. No si/hi. Pre-DM  Lab Results   Component Value Date    HGBA1C 5.9 (H) 01/05/2023       Diet and Physical Activity  · Diet/Nutrition: well balanced diet. · Exercise: walking. Depression Screening  PHQ-2/9 Depression Screening    Little interest or pleasure in doing things: 0 - not at all  Feeling down, depressed, or hopeless: 0 - not at all  PHQ-2 Score: 0  PHQ-2 Interpretation: Negative depression screen       General Health  · Sleep: sleeps poorly. Snores, does not feel well rested, etc.    · Hearing: no major change. · Vision: no vision problems. · Dental: regular dental visits. /GYN Health  · Patient is: premenopausal  · Last menstrual period: monthly  · Contraceptive method: IUD placement. 9/2022 -- sees Dr. Marlon Borges      Review of Systems:     Review of Systems   All other systems reviewed and are negative.      Past Medical History:     Past Medical History:   Diagnosis Date   • ADHD    • Anxiety and depression    • Diabetes mellitus during pregnancy    • Herpes labialis     Resolved 6/1/2016       Past Surgical History:     Past Surgical History:   Procedure Laterality Date   • TONSILLECTOMY AND ADENOIDECTOMY        Social History:     Social History     Socioeconomic History   • Marital status: Single     Spouse name: None   • Number of children: None   • Years of education: None   • Highest education level: None   Occupational History   • None   Tobacco Use   • Smoking status: Never   • Smokeless tobacco: Never   Vaping Use   • Vaping Use: Never used   Substance and Sexual Activity   • Alcohol use: Not Currently   • Drug use: Never   • Sexual activity: None   Other Topics Concern   • None   Social History Narrative   • None     Social Determinants of Health     Financial Resource Strain: Not on file   Food Insecurity: Not on file   Transportation Needs: Not on file   Physical Activity: Not on file   Stress: Not on file   Social Connections: Not on file   Intimate Partner Violence: Not on file   Housing Stability: Not on file      Family History:     Family History   Problem Relation Age of Onset   • Anxiety disorder Mother         NOS   • Depression Mother    • Diabetes Father    • No Known Problems Sister    • No Known Problems Sister    • No Known Problems Daughter    • No Known Problems Daughter    • No Known Problems Daughter    • No Known Problems Maternal Grandmother    • No Known Problems Maternal Grandfather    • No Known Problems Paternal Grandmother    • No Known Problems Paternal Grandfather    • Diabetes Brother    • Chromosomal disorder Son    • No Known Problems Maternal Aunt    • No Known Problems Paternal Aunt    • No Known Problems Paternal Aunt    • No Known Problems Paternal Aunt    • No Known Problems Paternal Aunt    • No Known Problems Paternal Aunt    • No Known Problems Paternal Aunt    • No Known Problems Paternal Aunt    • No Known Problems Paternal Aunt    • No Known Problems Paternal Aunt    • No Known Problems Paternal Aunt    • No Known Problems Paternal Aunt    • No Known Problems Paternal Aunt    • No Known Problems Paternal Aunt    • No Known Problems Paternal Aunt    • Breast cancer Neg Hx    • BRCA1 Positive Neg Hx    • BRCA1 Negative Neg Hx    • Ovarian cancer Neg Hx    • BRCA 1/2 Neg Hx    • Endometrial cancer Neg Hx    • Colon cancer Neg Hx    • Breast cancer additional onset Neg Hx    • BRCA2 Negative Neg Hx    • BRCA2 Positive Neg Hx       Current Medications:     Current Outpatient Medications   Medication Sig Dispense Refill   • amitriptyline (ELAVIL) 10 mg tablet Take 20 mg by mouth daily at bedtime     • amphetamine-dextroamphetamine (ADDERALL) 10 mg tablet Take 1 tablet by mouth every morning     • buPROPion (Wellbutrin SR) 200 MG 12 hr tablet Take 1 tablet (200 mg total) by mouth 2 (two) times a day 180 tablet 1   • cholecalciferol (VITAMIN D3) 1,000 units tablet Take 2 tablets (2,000 Units total) by mouth daily 60 tablet 5   • citalopram (CeleXA) 40 mg tablet Take 40 mg by mouth every morning     • lamoTRIgine (LaMICtal) 100 mg tablet Take 100 mg by mouth every morning     • naltrexone (REVIA) 50 mg tablet Take 0.5 tablets (25 mg total) by mouth daily for 14 days, THEN 1 tablet (50 mg total) daily for 16 days. 30 tablet 1   • pantoprazole (PROTONIX) 40 mg tablet 1 tab po 2 x daily x 2 months then 1 tab po daily 60 tablet 5   • Strattera 18 MG capsule Take 18 mg by mouth every morning     • SUMAtriptan (IMITREX) 100 mg tablet Take 1 tablet (100 mg total) by mouth once as needed for migraine for up to 1 dose 12 tablet 0   • valACYclovir (VALTREX) 500 mg tablet Take 1 tablet (500 mg total) by mouth daily 30 tablet 0   • zolpidem (AMBIEN) 10 mg tablet Take 10 mg by mouth every evening     • selenium sulfide (SELSUN) 2.5 % shampoo Apply topically daily as needed for dandruff 118 mL 5     No current facility-administered medications for this visit. Allergies: Allergies   Allergen Reactions   • Iodine - Food Allergy Other (See Comments)     unknown   • Zofran [Ondansetron] Itching      Physical Exam:     /68   Pulse 96   Temp 98 °F (36.7 °C) (Temporal)   Resp 18   Wt 80.8 kg (178 lb 3.2 oz)   SpO2 98%   BMI 28.76 kg/m²     Physical Exam  Vitals and nursing note reviewed.    Constitutional: General: She is not in acute distress. Appearance: She is well-developed. She is not ill-appearing. HENT:      Head: Normocephalic and atraumatic. Right Ear: Tympanic membrane and ear canal normal.      Left Ear: Tympanic membrane and ear canal normal.      Nose: Nose normal.      Mouth/Throat:      Mouth: Mucous membranes are moist.      Pharynx: Oropharynx is clear. Eyes:      Conjunctiva/sclera: Conjunctivae normal.   Cardiovascular:      Rate and Rhythm: Normal rate and regular rhythm. Heart sounds: Normal heart sounds. No murmur heard. Pulmonary:      Effort: Pulmonary effort is normal. No respiratory distress. Breath sounds: Normal breath sounds. No wheezing, rhonchi or rales. Abdominal:      Palpations: Abdomen is soft. Tenderness: There is no abdominal tenderness. There is no guarding. Musculoskeletal:         General: No swelling. Cervical back: Neck supple. Lymphadenopathy:      Cervical: No cervical adenopathy. Skin:     General: Skin is warm and dry. Capillary Refill: Capillary refill takes less than 2 seconds. Neurological:      General: No focal deficit present. Mental Status: She is alert and oriented to person, place, and time. Psychiatric:         Mood and Affect: Mood normal.         Behavior: Behavior normal.         Thought Content:  Thought content normal.         Judgment: Judgment normal.          Jojo Gardner, DO  [address] [facility], [place]

## 2023-07-30 DIAGNOSIS — K21.9 GASTROESOPHAGEAL REFLUX DISEASE, UNSPECIFIED WHETHER ESOPHAGITIS PRESENT: ICD-10-CM

## 2023-07-31 RX ORDER — PANTOPRAZOLE SODIUM 40 MG/1
TABLET, DELAYED RELEASE ORAL
Qty: 60 TABLET | Refills: 5 | Status: SHIPPED | OUTPATIENT
Start: 2023-07-31

## 2023-08-04 RX ORDER — BUPROPION HYDROCHLORIDE 200 MG/1
200 TABLET, EXTENDED RELEASE ORAL 2 TIMES DAILY
Qty: 180 TABLET | Refills: 1 | Status: SHIPPED | OUTPATIENT
Start: 2023-08-04

## 2023-10-06 ENCOUNTER — RA CDI HCC (OUTPATIENT)
Dept: OTHER | Facility: HOSPITAL | Age: 48
End: 2023-10-06

## 2023-10-06 NOTE — PROGRESS NOTES
[address] [facility] coding opportunities       Chart reviewed, no opportunity found: CHART REVIEWED, NO OPPORTUNITY FOUND        Patients Insurance        Commercial Insurance: [employer]

## 2023-12-04 ENCOUNTER — OFFICE VISIT (OUTPATIENT)
Age: 48
End: 2023-12-04
Payer: COMMERCIAL

## 2023-12-04 VITALS
HEIGHT: 66 IN | HEART RATE: 100 BPM | SYSTOLIC BLOOD PRESSURE: 114 MMHG | OXYGEN SATURATION: 98 % | WEIGHT: 181 LBS | DIASTOLIC BLOOD PRESSURE: 72 MMHG | BODY MASS INDEX: 29.09 KG/M2 | TEMPERATURE: 98.1 F

## 2023-12-04 DIAGNOSIS — G47.19 EXCESSIVE DAYTIME SLEEPINESS: ICD-10-CM

## 2023-12-04 DIAGNOSIS — Z91.89 AT RISK FOR OBSTRUCTIVE SLEEP APNEA: ICD-10-CM

## 2023-12-04 DIAGNOSIS — R11.11 VOMITING WITHOUT NAUSEA, UNSPECIFIED VOMITING TYPE: ICD-10-CM

## 2023-12-04 DIAGNOSIS — G47.00 INSOMNIA, UNSPECIFIED TYPE: ICD-10-CM

## 2023-12-04 DIAGNOSIS — R06.83 SNORING: Primary | ICD-10-CM

## 2023-12-04 PROCEDURE — 99205 OFFICE O/P NEW HI 60 MIN: CPT | Performed by: INTERNAL MEDICINE

## 2023-12-04 NOTE — PROGRESS NOTES
Sleep Consultation   Lulú Clarke 50 y.o. female MRN: [id number]      Reason for consultation: Snoring    Requesting physician: Dr. Jet Marmolejo DO (PCP)    Assessment/Plan  Patient is a 48yo F with PMH including ADHD, migraines, anxiety, depression, insomnia, pre-diabetes who presents for evaluation of loud snoring, nocturnal choking, gasping, shortness of breath ongoing for the past 9-10 months. Patient reports her symptoms worsened after recent weight gain of approximately 30lb over the last year. She also has sudden nocturnal emesis (without prodrome) associating with choking episodes. Denies hematemesis, weight loss or red flag symptoms. STOP-BANG 2/8, ESS 0/24. She has high risk of having obstructive sleep apnea based on snoring, gasping, nocturnal choking, Mallampati IV. Discussed risks of untreated sleep apnea such as sudden cardiac death by arrhythmia, uncontrolled hypertension, difficulty with weight loss, decreased quality sleep, increased insulin resistance, and stroke. Will obtain home sleep study at this time to rule out obstructive sleep apnea. Discussed that if she were to be diagnosed with VENUS, gold standard of treatment is PAP therapy, however would like patient to be evaluated and cleared by GI prior to potentially initiating PAP therapy if indicated in view of nocturnal emesis. Patient also reports a longstanding history of insomnia for the last 14 years, for which she takes Ambien 10mg every night (managed through her Psychiatrist at OSH), however with little efficacy as sleep latency remains approximately 60 minutes. Reports 5mg hs was ineffective for her. I discussed with patient that I do not recommend Ambien for her, and have recommended she discuss with her Psychiatrist an alternative medication. Additionally, discussed with patient that her current dose exceeds maximum recommendations for women.  We also discussed that Straterra (prescribed for ADHD through her Psychiatrist) is also likely worsening insomnia. She also takes Amitriptyline 20mg in the morning for migraine headaches, and this is likely worsening her excessive daytime sleepiness, and have recommended that she start taking this medication at night as it may cause drowsiness. Advised patient to follow up with her Psychiatrist to review current medications and potentially make adjustments. Will evaluate patient after HST is completed to review results and reassess current symptoms. 1. Snoring  - Ambulatory Referral to Sleep Medicine  - Home Study; Future    2. At risk for obstructive sleep apnea  - Home Study; Future    3. Excessive daytime sleepiness  - Ambulatory Referral to Sleep Medicine  - Home Study; Future    4. Insomnia, unspecified type  - Home Study; Future    5. Vomiting without nausea, unspecified vomiting type  - Ambulatory Referral to Gastroenterology; Future         History of Present Illness   HPI:  Priya Ambriz is a 50 y.o. female with PMHx including ADHD, migraines, anxiety, depression, insomnia, pre-diabetes. Patient presents for evaluation of snoring. Patient tells me she has been snoring loudly and choking during her sleep for the last 9-10 months. She also wakes up gasping and short of breath at night. Denies witnessed apneic events She also started vomiting at night, most days 1x per night she wakes up with sudden urge to vomit, no nausea prodrome. She stops eating at 5:00PM. Denies hematemesis. Denies vomiting in the daytime. She takes Protonix for GERD BID with good control of daytime and nighttime symptoms. Admits to morning headaches, but also has migraine headaches. Admits to morning dry mouth. Complains of excessive daytime sleepiness. Denies nasal congestion. Patient has a longstanding history of insomnia for which she takes [place] for 14 years. Denies any side effects from [place], most notably denies parasomnias. Psychiatry manages the [place].  Insomnia started after she had her special needs son. She has nurses at night who also care for her son at home. Denies driving while drowsy. She also has a history of ADHD for which she takes strattera 18mg in the morning, managed through her PCP. She gained approximately 30lb in the last year, is working on weight loss with diet modification. She tried [employer] and contrave however these caused nausea. Work history: She is a  at [employer]   Work hours: 7:00AM-3:30PM  Living situation: , daughter age 16 and son at 15    Sleep schedule: Goes to bed around 9:30PM on work nights (goes to bed later at 11:00PM when she is off the following day). Sleep latency is 60 minutes after taking ambien 10mg. She wakes up at least 5-10x per night, is not able to fall back asleep after wake onset. Feels that her "brain does not shut off." Wakes up at 5:00AM everyday with an alarm. Does not take naps. Total Sleep Time: approximately 8 hours on work days, 6 hours on weekends   Sleep medications: Yes, ambien 10mg x14 years   Caffeine use: 16oz coffee in the morning, some energy drinks intermittently 20 oz red bull  Alcohol use:  denies  Cigarettes:  denies    Other sleep related behaviors and symptoms:   Restless leg symptoms: yes, symptoms are intermittent and improved with weighted blanket  Kicking legs during sleep: denies  Parasomnias:  denies  Nightmares:  denies  Acid reflux during sleep:  denies  Teeth grinding:  denies  Sleep paralysis, cataplexy, sleep attacks or hypnagogic or hypnopompic hallucinations:  denies  REM Behavioral Sleep Disorder:  denies    Mood problems: denies  Problems at work, school or at home: denies    Macon Score: 0/24  Sitting and reading: Would never doze  Watching TV: Would never doze  Sitting, inactive in a public place (e.g. a theatre or a meeting):  Would never doze  As a passenger in a car for an hour without a break: Would never doze  Lying down to rest in the afternoon when circumstances permit: Would never doze  Sitting and talking to someone: Would never doze  Sitting quietly after a lunch without alcohol: Would never doze  In a car, while stopped for a few minutes in traffic: Would never doze  Total score: 0    History of tonsillectomy: yes, age 9    Historical Information   Past Medical History:   Diagnosis Date    ADHD     Anxiety 1990    All my life    Anxiety and depression     Depression     All my life    Diabetes mellitus during pregnancy     GERD (gastroesophageal reflux disease) 2003    Over 14 years    Herpes labialis     Resolved 6/1/2016      Past Surgical History:   Procedure Laterality Date    TONSILLECTOMY AND ADENOIDECTOMY       Family History   Problem Relation Age of Onset    Anxiety disorder Mother         NOS    Depression Mother     Diabetes Father     No Known Problems Sister     No Known Problems Sister     No Known Problems Daughter     No Known Problems Daughter     No Known Problems Daughter     No Known Problems Maternal Grandmother     No Known Problems Maternal Grandfather     No Known Problems Paternal Grandmother     No Known Problems Paternal Grandfather     Diabetes Brother     Chromosomal disorder Son     No Known Problems Maternal Aunt     No Known Problems Paternal Aunt     No Known Problems Paternal Aunt     No Known Problems Paternal Aunt     No Known Problems Paternal Aunt     No Known Problems Paternal Aunt     No Known Problems Paternal Aunt     No Known Problems Paternal Aunt     No Known Problems Paternal Aunt     No Known Problems Paternal Aunt     No Known Problems Paternal Aunt     No Known Problems Paternal Aunt     No Known Problems Paternal Aunt     No Known Problems Paternal Aunt     No Known Problems Paternal Aunt     Breast cancer Neg Hx     BRCA1 Positive Neg Hx     BRCA1 Negative Neg Hx     Ovarian cancer Neg Hx     BRCA 1/2 Neg Hx     Endometrial cancer Neg Hx     Colon cancer Neg Hx     Breast cancer additional onset Neg Hx     BRCA2 Negative Neg Hx     BRCA2 Positive Neg Hx      Social History     Socioeconomic History    Marital status: Single     Spouse name: Not on file    Number of children: Not on file    Years of education: Not on file    Highest education level: Not on file   Occupational History    Not on file   Tobacco Use    Smoking status: Never    Smokeless tobacco: Never   Vaping Use    Vaping Use: Never used   Substance and Sexual Activity    Alcohol use: Never    Drug use: Never    Sexual activity: Not on file   Other Topics Concern    Not on file   Social History Narrative    Not on file     Social Determinants of Health     Financial Resource Strain: Not on file   Food Insecurity: Not on file   Transportation Needs: Not on file   Physical Activity: Not on file   Stress: Not on file   Social Connections: Not on file   Intimate Partner Violence: Not on file   Housing Stability: Not on file       Meds/Allergies   Allergies   Allergen Reactions    Iodine - Food Allergy Other (See Comments)     unknown    Zofran [Ondansetron] Itching       Home medications:  Prior to Admission medications    Medication Sig Start Date End Date Taking?  Authorizing Provider   amitriptyline (ELAVIL) 10 mg tablet Take 20 mg by mouth daily at bedtime    Historical Provider, MD   amphetamine-dextroamphetamine (ADDERALL) 10 mg tablet Take 1 tablet by mouth every morning 11/28/21   Historical Provider, MD   buPROPion (WELLBUTRIN SR) 200 MG 12 hr tablet take 1 tablet by mouth twice a day 8/4/23   Jojo Navarro DO   cholecalciferol (VITAMIN D3) 1,000 units tablet Take 2 tablets (2,000 Units total) by mouth daily 11/14/22   MARGARET Valle   citalopram (CeleXA) 40 mg tablet Take 40 mg by mouth every morning 6/22/22   Historical Provider, MD   lamoTRIgine (LaMICtal) 100 mg tablet Take 100 mg by mouth every morning 12/24/21   Historical Provider, MD   naltrexone (REVIA) 50 mg tablet Take 0.5 tablets (25 mg total) by mouth daily for 14 days, THEN 1 tablet (50 mg total) daily for 16 days. 2/15/23 7/13/23  Amy Rosalba Lanes Doolan, DO   pantoprazole (PROTONIX) 40 mg tablet take 1 tablet by mouth twice a day for 2 MONTHS then 1 tablet daily 7/31/23   Jojo Gibson DO   selenium sulfide (SELSUN) 2.5 % shampoo Apply topically daily as needed for dandruff 7/13/22   MARGARET Crow   Strattera 18 MG capsule Take 18 mg by mouth every morning 12/24/21   Historical Provider, MD   SUMAtriptan (IMITREX) 100 mg tablet Take 1 tablet (100 mg total) by mouth once as needed for migraine for up to 1 dose 6/27/23   Jojo Montague DO   valACYclovir (VALTREX) 500 mg tablet Take 1 tablet (500 mg total) by mouth daily 6/27/23 7/27/23  Jojo Gibson DO   zolpidem (AMBIEN) 10 mg tablet Take 10 mg by mouth every evening 1/31/22   Historical Provider, MD       Vitals:   Blood pressure 114/72, pulse 100, temperature 98.1 °F (36.7 °C), height 5' 6" (1.676 m), weight 82.1 kg (181 lb), SpO2 98 %, not currently breastfeeding., Body mass index is 29.21 kg/m². Physical Exam:  General: Sitting in chair, awake alert and oriented to person, place, and time. No acute distress  HEENT: PERRL, Nares patent, no craniofacial abnormalities. Mucous membranes, moist, no oral lesions, and normal dentition. Mallampati class IV  NECK: Neck circumference 14.5inches, Trachea midline, no accessory muscle use, and no stridor   CARDIAC: Regular rate and rhythm  PULM: CTA bilaterally no wheezing, rhonchi or rales. No conversational dyspnea  EXT: No cyanosis, no clubbing, and no peripheral edema    NEURO: No focal neurologic deficits, moving all extremities appropriately    Labs: I have personally reviewed pertinent lab results.   Lab Results   Component Value Date    WBC 9.62 01/05/2023    HGB 12.9 01/05/2023    HCT 39.6 01/05/2023    MCV 89 01/05/2023     01/05/2023      Lab Results   Component Value Date    CALCIUM 8.9 01/05/2023    K 4.3 01/05/2023    CO2 25 01/05/2023     01/05/2023    BUN 16 01/05/2023    CREATININE 0.89 01/05/2023     No results found for: "IRON", "TIBC", "FERRITIN"  Lab Results   Component Value Date    [id number] 07/12/2022     No results found for: "FOLATE"    Arterial Blood Gas result:  N/A     Sleep studies: [facility] Sleep Fellow

## 2023-12-04 NOTE — LETTER
December 4, 2023     [address] [place], [place] [address]DO    Patient: Shun Aponte   YOB: 1975   Date of Visit: 12/4/2023       Dear Dr. Ml Willson: Thank you for referring Ariadne Pitts to me for evaluation. Below are my notes for this consultation. If you have questions, please do not hesitate to call me. I look forward to following your patient along with you. Sincerely,        Dick Garza MD        CC: No Recipients    Dick Garza MD  12/4/2023  9:19 AM  Attested  Sleep Consultation   Shun Aponte 50 y.o. female MRN: [id number]      Reason for consultation: Snoring    Requesting physician: Dr. Jansen [facility], DO (PCP)    Assessment/Plan  Patient is a 48yo F with PMH including ADHD, migraines, anxiety, depression, insomnia, pre-diabetes who presents for evaluation of loud snoring, nocturnal choking, gasping, shortness of breath ongoing for the past 9-10 months. Patient reports her symptoms worsened after recent weight gain of approximately 30lb over the last year. She also has sudden nocturnal emesis (without prodrome) associating with choking episodes. Denies hematemesis, weight loss or red flag symptoms. STOP-BANG 2/8, ESS 0/24. She has high risk of having obstructive sleep apnea based on snoring, gasping, nocturnal choking, Mallampati IV. Discussed risks of untreated sleep apnea such as sudden cardiac death by arrhythmia, uncontrolled hypertension, difficulty with weight loss, decreased quality sleep, increased insulin resistance, and stroke. Will obtain home sleep study at this time to rule out obstructive sleep apnea. Discussed that if she were to be diagnosed with VENUS, gold standard of treatment is PAP therapy, however would like patient to be evaluated and cleared by GI prior to potentially initiating PAP therapy if indicated in view of nocturnal emesis.      Patient also reports a longstanding history of insomnia for the last 14 years, for which she takes Ambien 10mg every night (managed through her Psychiatrist at [facility]), however with little efficacy as sleep latency remains approximately 60 minutes. Reports 5mg hs was ineffective for her. I discussed with patient that I do not recommend Ambien for her, and have recommended she discuss with her Psychiatrist an alternative medication. Additionally, discussed with patient that her current dose exceeds maximum recommendations for women. We also discussed that [address] (prescribed for ADHD through her Psychiatrist) is also likely worsening insomnia. She also takes Amitriptyline 20mg in the morning for migraine headaches, and this is likely worsening her excessive daytime sleepiness, and have recommended that she start taking this medication at night as it may cause drowsiness. Advised patient to follow up with her Psychiatrist to review current medications and potentially make adjustments. Will evaluate patient after HST is completed to review results and reassess current symptoms. 1. Snoring  - Ambulatory Referral to Sleep Medicine  - Home Study; Future    2. At risk for obstructive sleep apnea  - Home Study; Future    3. Excessive daytime sleepiness  - Ambulatory Referral to Sleep Medicine  - Home Study; Future    4. Insomnia, unspecified type  - Home Study; Future    5. Vomiting without nausea, unspecified vomiting type  - Ambulatory Referral to Gastroenterology; Future         History of Present Illness  HPI:  Rom Tejada is a 50 y.o. female with PMHx including ADHD, migraines, anxiety, depression, insomnia, pre-diabetes. Patient presents for evaluation of snoring. Patient tells me she has been snoring loudly and choking during her sleep for the last 9-10 months. She also wakes up gasping and short of breath at night. Denies witnessed apneic events She also started vomiting at night, most days 1x per night she wakes up with sudden urge to vomit, no nausea prodrome.  She stops eating at 5:00PM. Denies hematemesis. Denies vomiting in the daytime. She takes Protonix for GERD BID with good control of daytime and nighttime symptoms. Admits to morning headaches, but also has migraine headaches. Admits to morning dry mouth. Complains of excessive daytime sleepiness. Denies nasal congestion. Patient has a longstanding history of insomnia for which she takes [place] for 14 years. Denies any side effects from [place], most notably denies parasomnias. Psychiatry manages the [place]. Insomnia started after she had her special needs son. She has nurses at night who also care for her son at home. Denies driving while drowsy. She also has a history of ADHD for which she takes strattera 18mg in the morning, managed through her PCP. She gained approximately 30lb in the last year, is working on weight loss with diet modification. She tried [employer] and contrave however these caused nausea. Work history: She is a  at [employer]   Work hours: 7:00AM-3:30PM  Living situation: , daughter age 16 and son at 15    Sleep schedule: Goes to bed around 9:30PM on work nights (goes to bed later at 11:00PM when she is off the following day). Sleep latency is 60 minutes after taking ambien 10mg. She wakes up at least 5-10x per night, is not able to fall back asleep after wake onset. Feels that her "brain does not shut off." Wakes up at 5:00AM everyday with an alarm. Does not take naps.      Total Sleep Time: approximately 8 hours on work days, 6 hours on weekends   Sleep medications: Yes, ambien 10mg x14 years   Caffeine use: 16oz coffee in the morning, some energy drinks intermittently 20 oz red bull  Alcohol use:  denies  Cigarettes:  denies    Other sleep related behaviors and symptoms:   Restless leg symptoms: yes, symptoms are intermittent and improved with weighted blanket  Kicking legs during sleep: denies  Parasomnias:  denies  Nightmares:  denies  Acid reflux during sleep:  denies  Teeth grinding:  denies  Sleep paralysis, cataplexy, sleep attacks or hypnagogic or hypnopompic hallucinations:  denies  REM Behavioral Sleep Disorder:  denies    Mood problems: denies  Problems at work, school or at home: denies    Alma Score: 0/24  Sitting and reading: Would never doze  Watching TV: Would never doze  Sitting, inactive in a public place (e.g. a theatre or a meeting):  Would never doze  As a passenger in a car for an hour without a break: Would never doze  Lying down to rest in the afternoon when circumstances permit: Would never doze  Sitting and talking to someone: Would never doze  Sitting quietly after a lunch without alcohol: Would never doze  In a car, while stopped for a few minutes in traffic: Would never doze  Total score: 0    History of tonsillectomy: yes, age 9    Historical Information  Past Medical History:   Diagnosis Date   • ADHD    • Anxiety 1990    All my life   • Anxiety and depression    • Depression     All my life   • Diabetes mellitus during pregnancy    • GERD (gastroesophageal reflux disease) 2003    Over 14 years   • Herpes labialis     Resolved 6/1/2016      Past Surgical History:   Procedure Laterality Date   • TONSILLECTOMY AND ADENOIDECTOMY       Family History   Problem Relation Age of Onset   • Anxiety disorder Mother         NOS   • Depression Mother    • Diabetes Father    • No Known Problems Sister    • No Known Problems Sister    • No Known Problems Daughter    • No Known Problems Daughter    • No Known Problems Daughter    • No Known Problems Maternal Grandmother    • No Known Problems Maternal Grandfather    • No Known Problems Paternal Grandmother    • No Known Problems Paternal Grandfather    • Diabetes Brother    • Chromosomal disorder Son    • No Known Problems Maternal Aunt    • No Known Problems Paternal Aunt    • No Known Problems Paternal Aunt    • No Known Problems Paternal Aunt    • No Known Problems Paternal Aunt    • No Known Problems Paternal Aunt    • No Known Problems Paternal Aunt    • No Known Problems Paternal Aunt    • No Known Problems Paternal Aunt    • No Known Problems Paternal Aunt    • No Known Problems Paternal Aunt    • No Known Problems Paternal Aunt    • No Known Problems Paternal Aunt    • No Known Problems Paternal Aunt    • No Known Problems Paternal Aunt    • Breast cancer Neg Hx    • BRCA1 Positive Neg Hx    • BRCA1 Negative Neg Hx    • Ovarian cancer Neg Hx    • BRCA 1/2 Neg Hx    • Endometrial cancer Neg Hx    • Colon cancer Neg Hx    • Breast cancer additional onset Neg Hx    • BRCA2 Negative Neg Hx    • BRCA2 Positive Neg Hx      Social History     Socioeconomic History   • Marital status: Single     Spouse name: Not on file   • Number of children: Not on file   • Years of education: Not on file   • Highest education level: Not on file   Occupational History   • Not on file   Tobacco Use   • Smoking status: Never   • Smokeless tobacco: Never   Vaping Use   • Vaping Use: Never used   Substance and Sexual Activity   • Alcohol use: Never   • Drug use: Never   • Sexual activity: Not on file   Other Topics Concern   • Not on file   Social History Narrative   • Not on file     Social Determinants of Health     Financial Resource Strain: Not on file   Food Insecurity: Not on file   Transportation Needs: Not on file   Physical Activity: Not on file   Stress: Not on file   Social Connections: Not on file   Intimate Partner Violence: Not on file   Housing Stability: Not on file       Meds/Allergies  Allergies   Allergen Reactions   • Iodine - Food Allergy Other (See Comments)     unknown   • Zofran [Ondansetron] Itching       Home medications:  Prior to Admission medications    Medication Sig Start Date End Date Taking?  Authorizing Provider   amitriptyline (ELAVIL) 10 mg tablet Take 20 mg by mouth daily at bedtime    Historical Provider, MD   amphetamine-dextroamphetamine (ADDERALL) 10 mg tablet Take 1 tablet by mouth every morning 11/28/21 Historical Provider, MD   buPROPion [facility]) 200 MG 12 hr tablet take 1 tablet by mouth twice a day 8/4/23   Jojo Conrad DO   cholecalciferol (VITAMIN D3) 1,000 units tablet Take 2 tablets (2,000 Units total) by mouth daily 11/14/22   MARGARET Morales   citalopram (CeleXA) 40 mg tablet Take 40 mg by mouth every morning 6/22/22   Historical Provider, MD   lamoTRIgine (LaMICtal) 100 mg tablet Take 100 mg by mouth every morning 12/24/21   Historical Provider, MD   naltrexone (REVIA) 50 mg tablet Take 0.5 tablets (25 mg total) by mouth daily for 14 days, THEN 1 tablet (50 mg total) daily for 16 days. 2/15/23 7/13/23  Jojo Montague DO   pantoprazole (PROTONIX) 40 mg tablet take 1 tablet by mouth twice a day for 2 MONTHS then 1 tablet daily 7/31/23   Jojo Conrad DO   selenium sulfide (SELSUN) 2.5 % shampoo Apply topically daily as needed for dandruff 7/13/22   MARGARET Morales   Strattera 18 MG capsule Take 18 mg by mouth every morning 12/24/21   Historical Provider, MD   SUMAtriptan (IMITREX) 100 mg tablet Take 1 tablet (100 mg total) by mouth once as needed for migraine for up to 1 dose 6/27/23   Jojo Montague DO   valACYclovir (VALTREX) 500 mg tablet Take 1 tablet (500 mg total) by mouth daily 6/27/23 7/27/23  Jojo Conrad DO   zolpidem (AMBIEN) 10 mg tablet Take 10 mg by mouth every evening 1/31/22   Historical Provider, MD       Vitals:   Blood pressure 114/72, pulse 100, temperature 98.1 °F (36.7 °C), height 5' 6" (1.676 m), weight 82.1 kg (181 lb), SpO2 98 %, not currently breastfeeding., Body mass index is 29.21 kg/m². Physical Exam:  General: Sitting in chair, awake alert and oriented to person, place, and time. No acute distress  HEENT: PERRL, Nares patent, no craniofacial abnormalities. Mucous membranes, moist, no oral lesions, and normal dentition.  Mallampati class IV  NECK: Neck circumference 14.5inches, Trachea midline, no accessory muscle use, and no stridor   CARDIAC: Regular rate and rhythm  PULM: CTA bilaterally no wheezing, rhonchi or rales. No conversational dyspnea  EXT: No cyanosis, no clubbing, and no peripheral edema    NEURO: No focal neurologic deficits, moving all extremities appropriately    Labs: I have personally reviewed pertinent lab results. Lab Results   Component Value Date    WBC 9.62 01/05/2023    HGB 12.9 01/05/2023    HCT 39.6 01/05/2023    MCV 89 01/05/2023     01/05/2023      Lab Results   Component Value Date    CALCIUM 8.9 01/05/2023    K 4.3 01/05/2023    CO2 25 01/05/2023     01/05/2023    BUN 16 01/05/2023    CREATININE 0.89 01/05/2023     No results found for: "IRON", "TIBC", "FERRITIN"  Lab Results   Component Value Date    [id number] 645 07/12/2022     No results found for: "FOLATE"    Arterial Blood Gas result:  N/A     Sleep studies: [facility] Fellow    Attestation signed by Samantha Garland DO at 12/4/2023  9:19 AM:  I have personally seen and examined. I discussed the patient with the  fellow including, but not limited to, verifying findings; reviewing labs and x-rays;developing the plan of care with patient. I have reviewed the note and assessment performed by the fellow and agree with the fellow's documented findings and plan of care with the following additions. Please see my following comments for details and adjustments. Assessment/Plan  50 y.o. F with PMHx of ADHD, migraines, anxiety, depression, insomnia, pre-diabetes who comes in for evaluation of snoring, excessive daytime sleepiness and insomnia. 1.  Snoring/Excessive daytime sleepiness/Witnessed apneas -  Mallampati class 4, Body mass index is 29.21 kg/m².,  . She is at risk for obstructive sleep apnea based on STOP BANG survey.        -  Check a baseline polysomnogram to assess for obstructive sleep apnea      -  I discussed in depth the diagnostic studies and treatment options involved with obstructive sleep apnea      -  I also discussed in depth the risk of leaving sleep apnea untreated including hypertension, heart failure, arrhythmia, MI and stroke. -  The patient is agreeable to undergo testing and treatment of obstructive sleep apnea. She understands that pitfalls she may encounter along the way and is willing to attempt CPAP treatment. 2. Nocturnal emesis - I am concerned that this may limit our ability to manage sleep apnea. She will need to see GI and discuss if any adjustment in medication or treatment can be considered    3. Insomnia - we discussed that her medications are not ideal to manage her insomnia. -  Switch amitryptilline to bedtime       -  We discussed the future reduction of ambien in the future      -   She will benefit form CBT - I as well    4. ADHD - unclear if cognitive issues are due to sleep deprivation or not. For now she will continue straterra but may benefit from reduction or removal    5. Depression with possible bipolar disease?  - she is on lamictal.  The addition of a stimulant does not seem appropriate. Will further discuss with psychiatry     HPI:  Vicente Muñiz is a 50 y.o. female with PMHx as below who comes in for evaluation of daytime sleepiness. Patient notes  weight gain and symptoms of difficulty falling asleep, difficulty staying asleep, snoring, excessive daytime sleepiness despite an Vandervoort score of 0, awakenings with gasping, witnessed apneas, morning headaches, awakenings with dry mouth. she denies  symptoms of restless legs. she denies symptoms of cataplexy, sleep paralysis, hypnopompic or hypnagogic hallucinations. Sleep History:  she goes to bed at approximately 9:30 pm, will get to sleep in 60 min despite usage of ambien. She willl get out of bed at 6 am.  she will get up 5-10 times at night and not able to fall back asleep.   It will then take a few minutes to fall back asleep.   she does not typically nap during the day. She has been taking ambien for 14 years at this point.       Vitals:    12/04/23 0754   BP: 114/72   Pulse: 100   Temp: 98.1 °F (36.7 °C)   SpO2: 98%   RA  Physical Exam  General: Plan, Awake alert and oriented x 3, conversant without conversational dyspnea, NAD, normal affect  HEENT:  PERRL, Sclera noninjected, nonicteric OU, Nares patent,  no craniofacial abnormalities, Mucous membranes, moist, no oral lesions, normal dentition, Mallampati class 4  NECK: Trachea midline, no accessory muscle use, no stridor, no cervical or supraclavicular adenopathy, JVP not elevated  CARDIAC: Reg, single s1/S2, no m/r/g  PULM: CTA bilaterally no wheezing, rhonchi or rales  ABD: Normoactive bowel sounds, soft nontender, nondistended, no rebound, no rigidity, no guarding  EXT: No cyanosis, no clubbing, no edema, normal capillary refill  NEURO: no focal neurologic deficits, AAOx3, moving all extremities appropriately    Lab Results   Component Value Date    WBC 9.62 01/05/2023    HGB 12.9 01/05/2023    HCT 39.6 01/05/2023    MCV 89 01/05/2023     01/05/2023     Lab Results   Component Value Date    SODIUM 136 01/05/2023    K 4.3 01/05/2023     01/05/2023    CO2 25 01/05/2023    BUN 16 01/05/2023    CREATININE 0.89 01/05/2023    GLUC 105 02/17/2022    CALCIUM 8.9 01/05/2023     Lab Results   Component Value Date    ALT 52 01/05/2023    AST 21 01/05/2023    ALKPHOS 85 01/05/2023

## 2023-12-04 NOTE — PATIENT INSTRUCTIONS

## 2023-12-05 ENCOUNTER — TELEPHONE (OUTPATIENT)
Dept: ADMINISTRATIVE | Facility: HOSPITAL | Age: 48
End: 2023-12-05

## 2023-12-05 NOTE — TELEPHONE ENCOUNTER
Philippe Bojorquez! Pt returned my phone call stating that she has an in facility sleep study scheduled for tomorrow night somewhere in [address] [place] [address]. Patient is wondering if this would be ok to undergo or would you rather her have the home study first?    Pt explained she does not mind doing either one but if she doesn't need to do both, which would would you prefer her to have done? Please advise and let me know at your earliest convenience! Thanks!   Rosario Logan

## 2023-12-06 NOTE — TELEPHONE ENCOUNTER
I prefer patient undergo home sleep study that was ordered through [facility]. She can cancel her appointment for the other study.

## 2023-12-28 ENCOUNTER — TELEPHONE (OUTPATIENT)
Age: 48
End: 2023-12-28

## 2023-12-28 NOTE — TELEPHONE ENCOUNTER
Patients GI provider:  Dr. Nassar    Number to return call: [phone]    Reason for call: Pt GI complaint - N/V when laying down     Scheduled procedure/appointment date if applicable: Appt 1/11/24

## 2024-10-17 DIAGNOSIS — K21.9 GASTROESOPHAGEAL REFLUX DISEASE, UNSPECIFIED WHETHER ESOPHAGITIS PRESENT: ICD-10-CM

## 2024-10-17 DIAGNOSIS — B00.1 COLD SORE: ICD-10-CM

## 2024-10-17 DIAGNOSIS — G43.809 OTHER MIGRAINE WITHOUT STATUS MIGRAINOSUS, NOT INTRACTABLE: ICD-10-CM

## 2024-10-17 RX ORDER — PANTOPRAZOLE SODIUM 40 MG/1
40 TABLET, DELAYED RELEASE ORAL DAILY
Qty: 30 TABLET | Refills: 0 | Status: SHIPPED | OUTPATIENT
Start: 2024-10-17

## 2024-10-17 RX ORDER — VALACYCLOVIR HYDROCHLORIDE 500 MG/1
500 TABLET, FILM COATED ORAL DAILY
Qty: 30 TABLET | Refills: 0 | Status: SHIPPED | OUTPATIENT
Start: 2024-10-17 | End: 2024-11-16

## 2024-10-17 RX ORDER — SUMATRIPTAN 100 MG/1
100 TABLET, FILM COATED ORAL ONCE AS NEEDED
Qty: 8 TABLET | Refills: 0 | Status: SHIPPED | OUTPATIENT
Start: 2024-10-17

## 2024-11-10 DIAGNOSIS — G43.809 OTHER MIGRAINE WITHOUT STATUS MIGRAINOSUS, NOT INTRACTABLE: ICD-10-CM

## 2024-11-10 DIAGNOSIS — B00.1 COLD SORE: ICD-10-CM

## 2024-11-12 RX ORDER — SUMATRIPTAN SUCCINATE 100 MG/1
TABLET ORAL
Qty: 8 TABLET | Refills: 0 | OUTPATIENT
Start: 2024-11-12

## 2024-11-12 RX ORDER — VALACYCLOVIR HYDROCHLORIDE 500 MG/1
500 TABLET, FILM COATED ORAL DAILY
Qty: 30 TABLET | Refills: 0 | OUTPATIENT
Start: 2024-11-12

## 2024-11-15 ENCOUNTER — TELEPHONE (OUTPATIENT)
Dept: FAMILY MEDICINE CLINIC | Facility: CLINIC | Age: 49
End: 2024-11-15

## 2024-12-13 ENCOUNTER — RA CDI HCC (OUTPATIENT)
Dept: OTHER | Facility: HOSPITAL | Age: 49
End: 2024-12-13

## 2024-12-13 NOTE — PROGRESS NOTES
HCC coding opportunities          Chart Reviewed number of suggestions sent to Provider: 1   F32.5    Patients Insurance        Commercial Insurance: [employer]

## 2025-01-14 ENCOUNTER — OFFICE VISIT (OUTPATIENT)
Dept: URGENT CARE | Facility: CLINIC | Age: 50
End: 2025-01-14
Payer: COMMERCIAL

## 2025-01-14 VITALS
SYSTOLIC BLOOD PRESSURE: 136 MMHG | TEMPERATURE: 97.9 F | BODY MASS INDEX: 28.93 KG/M2 | OXYGEN SATURATION: 100 % | WEIGHT: 180 LBS | RESPIRATION RATE: 18 BRPM | HEART RATE: 106 BPM | DIASTOLIC BLOOD PRESSURE: 68 MMHG | HEIGHT: 66 IN

## 2025-01-14 DIAGNOSIS — J04.0 ACUTE LARYNGITIS: Primary | ICD-10-CM

## 2025-01-14 LAB — S PYO AG THROAT QL: NEGATIVE

## 2025-01-14 PROCEDURE — S9083 URGENT CARE CENTER GLOBAL: HCPCS

## 2025-01-14 PROCEDURE — 87880 STREP A ASSAY W/OPTIC: CPT

## 2025-01-14 PROCEDURE — G0382 LEV 3 HOSP TYPE B ED VISIT: HCPCS

## 2025-01-14 RX ORDER — PREDNISONE 20 MG/1
40 TABLET ORAL DAILY
Qty: 10 TABLET | Refills: 0 | Status: SHIPPED | OUTPATIENT
Start: 2025-01-14 | End: 2025-01-19

## 2025-01-14 NOTE — LETTER
January 14, 2025     Patient: Juvencio Maldonado   YOB: 1975   Date of Visit: 1/14/2025       To Whom It May Concern:    It is my medical opinion that Juvecnio Maldonado may return to work on 1/16/2025 .    If you have any questions or concerns, please don't hesitate to call.         Sincerely,        MARGARET Mars    CC: No Recipients

## 2025-01-14 NOTE — PROGRESS NOTES
[facility] Now        NAME: Juvencio Maldonado is a 49 y.o. female  : 1975    MRN: [id number]  DATE: 2025  TIME: 11:18 AM    Assessment and Plan   Acute laryngitis [J04.0]  1. 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  Strep negative    Patient Instructions     Tylenol, steroids, warm salt water gargles, honey, throat spray   Vocal rest  Follow up with PCP in 3-5 days.  Proceed to  ER if symptoms worsen.    Chief Complaint     Chief Complaint   Patient presents with    Sore Throat     Started 1 day ago  Sore throat, and loss of voice  OTC robitussin  Request note for work         History of Present Illness       Patient is a 49-year-old female presents the office today for sore throat loss of voice which started yesterday.  She is a high school .  She denies fever.  Also has some cough.        Review of Systems   Review of Systems   HENT:  Positive for sore throat and voice change.    Respiratory:  Positive for cough.    All other systems reviewed and are negative.        Current Medications       Current Outpatient Medications:     amitriptyline (ELAVIL) 10 mg tablet, Take 20 mg by mouth daily at bedtime, Disp: , Rfl:     amphetamine-dextroamphetamine (ADDERALL) 10 mg tablet, Take 1 tablet by mouth every morning, Disp: , Rfl:     buPROPion (WELLBUTRIN SR) 200 MG 12 hr tablet, take 1 tablet by mouth twice a day, Disp: 180 tablet, Rfl: 1    cholecalciferol (VITAMIN D3) 1,000 units tablet, Take 2 tablets (2,000 Units total) by mouth daily, Disp: 60 tablet, Rfl: 5    citalopram (CeleXA) 40 mg tablet, Take 40 mg by mouth every morning, Disp: , Rfl:     lamoTRIgine (LaMICtal) 100 mg tablet, Take 100 mg by mouth every morning, Disp: , Rfl:     naltrexone (REVIA) 50 mg tablet, Take 0.5 tablets (25 mg total) by mouth daily for 14 days, THEN 1 tablet (50 mg total) daily for 16 days., Disp: 30  tablet, Rfl: 1    pantoprazole (PROTONIX) 40 mg tablet, Take 1 tablet (40 mg total) by mouth daily take 1 tablet by mouth twice a day for 2 MONTHS then 1 tablet daily, Disp: 30 tablet, Rfl: 0    predniSONE 20 mg tablet, Take 2 tablets (40 mg total) by mouth daily for 5 days, Disp: 10 tablet, Rfl: 0    selenium sulfide (SELSUN) 2.5 % shampoo, Apply topically daily as needed for dandruff, Disp: 118 mL, Rfl: 5    Strattera 18 MG capsule, Take 18 mg by mouth every morning, Disp: , Rfl:     SUMAtriptan (IMITREX) 100 mg tablet, Take 1 tablet (100 mg total) by mouth once as needed for migraine, Disp: 8 tablet, Rfl: 0    valACYclovir (VALTREX) 500 mg tablet, Take 1 tablet (500 mg total) by mouth daily, Disp: 30 tablet, Rfl: 0    zolpidem (AMBIEN) 10 mg tablet, Take 10 mg by mouth every evening, Disp: , Rfl:     Current Allergies     Allergies as of 01/14/2025 - Reviewed 01/14/2025   Allergen Reaction Noted    Iodine - food allergy Other (See Comments) 04/23/2019    Zofran [ondansetron] Itching 07/11/2022            The following portions of the patient's history were reviewed and updated as appropriate: allergies, current medications, past family history, past medical history, past social history, past surgical history and problem list.     Past Medical History:   Diagnosis Date    ADHD     Anxiety 1990    All my life    Anxiety and depression     Depression     All my life    Diabetes mellitus during pregnancy     GERD (gastroesophageal reflux disease) 2003    Over 14 years    Herpes labialis     Resolved 6/1/2016        Past Surgical History:   Procedure Laterality Date    TONSILLECTOMY AND ADENOIDECTOMY         Family History   Problem Relation Age of Onset    Anxiety disorder Mother         NOS    Depression Mother     Diabetes Father     No Known Problems Sister     No Known Problems Sister     No Known Problems Daughter     No Known Problems Daughter     No Known Problems Daughter     No Known Problems Maternal  "Grandmother     No Known Problems Maternal Grandfather     No Known Problems Paternal Grandmother     No Known Problems Paternal Grandfather     Diabetes Brother     Chromosomal disorder Son     No Known Problems Maternal Aunt     No Known Problems Paternal Aunt     No Known Problems Paternal Aunt     No Known Problems Paternal Aunt     No Known Problems Paternal Aunt     No Known Problems Paternal Aunt     No Known Problems Paternal Aunt     No Known Problems Paternal Aunt     No Known Problems Paternal Aunt     No Known Problems Paternal Aunt     No Known Problems Paternal Aunt     No Known Problems Paternal Aunt     No Known Problems Paternal Aunt     No Known Problems Paternal Aunt     No Known Problems Paternal Aunt     Breast cancer Neg Hx     BRCA1 Positive Neg Hx     BRCA1 Negative Neg Hx     Ovarian cancer Neg Hx     BRCA 1/2 Neg Hx     Endometrial cancer Neg Hx     Colon cancer Neg Hx     Breast cancer additional onset Neg Hx     BRCA2 Negative Neg Hx     BRCA2 Positive Neg Hx          Medications have been verified.        Objective   /68   Pulse (!) 106   Temp 97.9 °F (36.6 °C)   Resp 18   Ht 5' 6\" (1.676 m)   Wt 81.6 kg (180 lb)   LMP  (LMP Unknown)   SpO2 100%   BMI 29.05 kg/m²        Physical Exam     Physical Exam  Vitals and nursing note reviewed.   Constitutional:       General: She is not in acute distress.     Appearance: She is obese. She is not ill-appearing or toxic-appearing.   HENT:      Right Ear: Tympanic membrane normal.      Left Ear: Tympanic membrane normal.      Mouth/Throat:      Mouth: Mucous membranes are moist.      Pharynx: Postnasal drip present. No posterior oropharyngeal erythema.      Tonsils: No tonsillar exudate or tonsillar abscesses.   Neck:      Thyroid: No thyromegaly.   Cardiovascular:      Rate and Rhythm: Normal rate and regular rhythm.      Heart sounds: Normal heart sounds.   Pulmonary:      Effort: Pulmonary effort is normal.      Breath sounds: " Normal breath sounds.   Skin:     General: Skin is warm.      Capillary Refill: Capillary refill takes less than 2 seconds.   Neurological:      Mental Status: She is alert.

## 2025-02-21 ENCOUNTER — TELEPHONE (OUTPATIENT)
Dept: FAMILY MEDICINE CLINIC | Facility: CLINIC | Age: 50
End: 2025-02-21

## 2025-02-21 NOTE — TELEPHONE ENCOUNTER
LVM  asking pt to reschedule annual physical as it is very past due. Please assist pt with rescheduling. TY

## 2025-03-26 DIAGNOSIS — K21.9 GASTROESOPHAGEAL REFLUX DISEASE, UNSPECIFIED WHETHER ESOPHAGITIS PRESENT: ICD-10-CM

## 2025-03-28 RX ORDER — PANTOPRAZOLE SODIUM 40 MG/1
40 TABLET, DELAYED RELEASE ORAL DAILY
Qty: 30 TABLET | Refills: 0 | Status: SHIPPED | OUTPATIENT
Start: 2025-03-28

## 2025-03-31 ENCOUNTER — HOSPITAL ENCOUNTER (EMERGENCY)
Facility: HOSPITAL | Age: 50
Discharge: HOME | End: 2025-03-31
Attending: EMERGENCY MEDICINE
Payer: COMMERCIAL

## 2025-03-31 VITALS
DIASTOLIC BLOOD PRESSURE: 59 MMHG | TEMPERATURE: 97.1 F | RESPIRATION RATE: 18 BRPM | SYSTOLIC BLOOD PRESSURE: 112 MMHG | HEART RATE: 99 BPM | OXYGEN SATURATION: 98 %

## 2025-03-31 DIAGNOSIS — K21.9 GERD (GASTROESOPHAGEAL REFLUX DISEASE): Primary | ICD-10-CM

## 2025-03-31 LAB
ALBUMIN SERPL BCG-MCNC: 4.3 G/DL (ref 3.5–5)
ALP SERPL-CCNC: 83 U/L (ref 34–104)
ALT SERPL W P-5'-P-CCNC: 20 U/L (ref 7–52)
ANION GAP SERPL CALCULATED.3IONS-SCNC: 7 MMOL/L (ref 4–13)
AST SERPL W P-5'-P-CCNC: 18 U/L (ref 13–39)
BASOPHILS # BLD AUTO: 0.06 THOUSANDS/ÂΜL (ref 0–0.1)
BASOPHILS NFR BLD AUTO: 1 % (ref 0–1)
BILIRUB SERPL-MCNC: 0.48 MG/DL (ref 0.2–1)
BUN SERPL-MCNC: 22 MG/DL (ref 5–25)
CALCIUM SERPL-MCNC: 8.7 MG/DL (ref 8.4–10.2)
CHLORIDE SERPL-SCNC: 104 MMOL/L (ref 96–108)
CO2 SERPL-SCNC: 24 MMOL/L (ref 21–32)
CREAT SERPL-MCNC: 0.79 MG/DL (ref 0.6–1.3)
EOSINOPHIL # BLD AUTO: 0.23 THOUSAND/ÂΜL (ref 0–0.61)
EOSINOPHIL NFR BLD AUTO: 2 % (ref 0–6)
ERYTHROCYTE [DISTWIDTH] IN BLOOD BY AUTOMATED COUNT: 16.1 % (ref 11.6–15.1)
GFR SERPL CREATININE-BSD FRML MDRD: 88 ML/MIN/1.73SQ M
GLUCOSE SERPL-MCNC: 96 MG/DL (ref 65–140)
HCT VFR BLD AUTO: 35.8 % (ref 34.8–46.1)
HGB BLD-MCNC: 11.4 G/DL (ref 11.5–15.4)
IMM GRANULOCYTES # BLD AUTO: 0.03 THOUSAND/UL (ref 0–0.2)
IMM GRANULOCYTES NFR BLD AUTO: 0 % (ref 0–2)
LIPASE SERPL-CCNC: 27 U/L (ref 11–82)
LYMPHOCYTES # BLD AUTO: 1.91 THOUSANDS/ÂΜL (ref 0.6–4.47)
LYMPHOCYTES NFR BLD AUTO: 20 % (ref 14–44)
MCH RBC QN AUTO: 24.9 PG (ref 26.8–34.3)
MCHC RBC AUTO-ENTMCNC: 31.8 G/DL (ref 31.4–37.4)
MCV RBC AUTO: 78 FL (ref 82–98)
MONOCYTES # BLD AUTO: 0.91 THOUSAND/ÂΜL (ref 0.17–1.22)
MONOCYTES NFR BLD AUTO: 9 % (ref 4–12)
NEUTROPHILS # BLD AUTO: 6.52 THOUSANDS/ÂΜL (ref 1.85–7.62)
NEUTS SEG NFR BLD AUTO: 68 % (ref 43–75)
NRBC BLD AUTO-RTO: 0 /100 WBCS
PLATELET # BLD AUTO: 455 THOUSANDS/UL (ref 149–390)
PMV BLD AUTO: 10 FL (ref 8.9–12.7)
POTASSIUM SERPL-SCNC: 4.2 MMOL/L (ref 3.5–5.3)
PROT SERPL-MCNC: 7.1 G/DL (ref 6.4–8.4)
RBC # BLD AUTO: 4.58 MILLION/UL (ref 3.81–5.12)
SODIUM SERPL-SCNC: 135 MMOL/L (ref 135–147)
WBC # BLD AUTO: 9.66 THOUSAND/UL (ref 4.31–10.16)

## 2025-03-31 PROCEDURE — 96374 THER/PROPH/DIAG INJ IV PUSH: CPT

## 2025-03-31 PROCEDURE — 83690 ASSAY OF LIPASE: CPT | Performed by: EMERGENCY MEDICINE

## 2025-03-31 PROCEDURE — 99284 EMERGENCY DEPT VISIT MOD MDM: CPT

## 2025-03-31 PROCEDURE — 85025 COMPLETE CBC W/AUTO DIFF WBC: CPT | Performed by: EMERGENCY MEDICINE

## 2025-03-31 PROCEDURE — 36415 COLL VENOUS BLD VENIPUNCTURE: CPT | Performed by: EMERGENCY MEDICINE

## 2025-03-31 PROCEDURE — 80053 COMPREHEN METABOLIC PANEL: CPT | Performed by: EMERGENCY MEDICINE

## 2025-03-31 PROCEDURE — 99284 EMERGENCY DEPT VISIT MOD MDM: CPT | Performed by: EMERGENCY MEDICINE

## 2025-03-31 RX ORDER — METOCLOPRAMIDE HYDROCHLORIDE 5 MG/ML
10 INJECTION INTRAMUSCULAR; INTRAVENOUS ONCE
Status: COMPLETED | OUTPATIENT
Start: 2025-03-31 | End: 2025-03-31

## 2025-03-31 RX ORDER — METOCLOPRAMIDE 10 MG/1
10 TABLET ORAL EVERY 6 HOURS PRN
Qty: 30 TABLET | Refills: 0 | Status: SHIPPED | OUTPATIENT
Start: 2025-03-31

## 2025-03-31 RX ADMIN — METOCLOPRAMIDE HYDROCHLORIDE 10 MG: 5 INJECTION INTRAMUSCULAR; INTRAVENOUS at 07:42

## 2025-03-31 NOTE — ED PROVIDER NOTES
Time reflects when diagnosis was documented in both MDM as applicable and the Disposition within this note       Time User Action Codes Description Comment    3/31/2025  7:37 AM RoyaJayson Add [K21.9] GERD (gastroesophageal reflux disease)           ED Disposition       ED Disposition   Discharge    Condition   Stable    Date/Time   Mon Mar 31, 2025  8:23 AM    Comment   Juvencio Maldonado discharge to home/self care.                   Assessment & Plan       Medical Decision Making  Patient presented to the emergency department and a MSE was performed. The patient was evaluated for complaint related to acute nausea and/or vomiting and/or diarrhea.  Patient is potentially at risk for, but not limited to, biliary colic, cholecystitis, pancreatitis, viral infection, celiac disease, irritable bowel syndrome, colitis, malabsorption syndrome, anxiety, depression, GERD or other disease process unrelated to the abdomen which may cause this symptomatology is also considered. Several of these diagnoses have been evaluated and ruled out by history and physical.  As needed, patient will be further evaluated with laboratory and imaging studies.  Higher level diagnostics, such as CT imaging or ultrasound, may also be required.  Please see work-up portion of the note for further evaluation of patient's risk.  Socioeconomic factors were also considered as part of the decision-making process.  Unless otherwise stated in the chart or patient is admitted as elsewhere documented, any previously prescribed medications will be maintained.      Problems Addressed:  GERD (gastroesophageal reflux disease): chronic illness or injury with exacerbation, progression, or side effects of treatment     Details: Symptomatology appears to be consistent with gastroparesis.  Patient should continue her medications and we also prescribed her Reglan.  Caution regarding possible medication side effects.  Recommend follow-up with GI as  scheduled.    Amount and/or Complexity of Data Reviewed  External Data Reviewed: notes.     Details: Reviewed office visit notes  Labs: ordered. Decision-making details documented in ED Course.    Risk  Prescription drug management.        ED Course as of 03/31/25 1008   Mon Mar 31, 2025   0835 Patient reports feeling better.  Patient stable for discharge.       Medications   metoclopramide (REGLAN) injection 10 mg (10 mg Intravenous Given 3/31/25 0742)       ED Risk Strat Scores                            SBIRT 20yo+      Flowsheet Row Most Recent Value   Initial Alcohol Screen: US AUDIT-C     1. How often do you have a drink containing alcohol? 0 Filed at: 03/31/2025 0720   2. How many drinks containing alcohol do you have on a typical day you are drinking?  0 Filed at: 03/31/2025 0720   3b. FEMALE Any Age, or MALE 65+: How often do you have 4 or more drinks on one occassion? 0 Filed at: 03/31/2025 0720   Audit-C Score 0 Filed at: 03/31/2025 0720   CHARISSA: How many times in the past year have you...    Used an illegal drug or used a prescription medication for non-medical reasons? Never Filed at: 03/31/2025 0720                            History of Present Illness       Chief Complaint   Patient presents with    Epigastric Pain     Pt reports currently being treated for reflux with protonix, states for the last few months it has gradually become worse, now vomiting with every meal       Past Medical History:   Diagnosis Date    ADHD     Anxiety 1990    All my life    Anxiety and depression     Depression     All my life    Diabetes mellitus during pregnancy     GERD (gastroesophageal reflux disease) 2003    Over 14 years    Herpes labialis     Resolved 6/1/2016       Past Surgical History:   Procedure Laterality Date    TONSILLECTOMY AND ADENOIDECTOMY        Family History   Problem Relation Age of Onset    Anxiety disorder Mother         NOS    Depression Mother     Diabetes Father     No Known Problems Sister      No Known Problems Sister     No Known Problems Daughter     No Known Problems Daughter     No Known Problems Daughter     No Known Problems Maternal Grandmother     No Known Problems Maternal Grandfather     No Known Problems Paternal Grandmother     No Known Problems Paternal Grandfather     Diabetes Brother     Chromosomal disorder Son     No Known Problems Maternal Aunt     No Known Problems Paternal Aunt     No Known Problems Paternal Aunt     No Known Problems Paternal Aunt     No Known Problems Paternal Aunt     No Known Problems Paternal Aunt     No Known Problems Paternal Aunt     No Known Problems Paternal Aunt     No Known Problems Paternal Aunt     No Known Problems Paternal Aunt     No Known Problems Paternal Aunt     No Known Problems Paternal Aunt     No Known Problems Paternal Aunt     No Known Problems Paternal Aunt     No Known Problems Paternal Aunt     Breast cancer Neg Hx     BRCA1 Positive Neg Hx     BRCA1 Negative Neg Hx     Ovarian cancer Neg Hx     BRCA 1/2 Neg Hx     Endometrial cancer Neg Hx     Colon cancer Neg Hx     Breast cancer additional onset Neg Hx     BRCA2 Negative Neg Hx     BRCA2 Positive Neg Hx       Social History     Tobacco Use    Smoking status: Never    Smokeless tobacco: Never   Vaping Use    Vaping status: Never Used   Substance Use Topics    Alcohol use: Never    Drug use: Never      E-Cigarette/Vaping    E-Cigarette Use Never User       E-Cigarette/Vaping Substances      I have reviewed and agree with the history as documented.     Patient is a 49-year-old female presenting to the emergency room with abdominal pain, nausea and vomiting.  Patient reporting vomiting occurring after meals.  Review of the records would indicate that after patient had not been seen for over a year she began requesting omeprazole refills from her primary care provider in August 2024.  Patient now reporting symptomatology becoming worse.  Patient has a history of ADHD, anxiety, depression,  "diabetes, GERD.    Patient reports to me that she feels as if her \"stomach is not emptying.\"  Patient reports that she feels as if she is throwing up food stuff from a day or so prior to when she ate it.  Patient is not having any chills.  She describes the discomfort as \"burning.\"  It is not radiating.  Patient has had no hematemesis or hematochezia.  Patient does have follow-up with gastroenterology scheduled for late May.      History provided by:  Patient  Epigastric Pain  Associated symptoms: abdominal pain, nausea and vomiting    Associated symptoms: no fatigue, no fever, no palpitations and no shortness of breath        Review of Systems   Constitutional:  Negative for chills, fatigue and fever.   Respiratory:  Negative for shortness of breath and wheezing.    Cardiovascular:  Negative for chest pain and palpitations.   Gastrointestinal:  Positive for abdominal pain, nausea and vomiting. Negative for diarrhea.   Genitourinary: Negative.            Objective       ED Triage Vitals   Temperature Pulse Blood Pressure Respirations SpO2 Patient Position - Orthostatic VS   03/31/25 0720 03/31/25 0720 03/31/25 0720 03/31/25 0720 03/31/25 0720 03/31/25 0815   (!) 97.1 °F (36.2 °C) 89 113/60 18 99 % Lying      Temp Source Heart Rate Source BP Location FiO2 (%) Pain Score    03/31/25 0720 03/31/25 0720 -- -- 03/31/25 0815    Temporal Monitor   No Pain      Vitals      Date and Time Temp Pulse SpO2 Resp BP Pain Score FACES Pain Rating User   03/31/25 0815 -- 99 98 % 18 112/59 No Pain -- AS   03/31/25 0720 97.1 °F (36.2 °C) 89 99 % 18 113/60 -- -- SS            Physical Exam  Vitals and nursing note reviewed.   Constitutional:       General: She is in acute distress.      Appearance: She is normal weight. She is not ill-appearing or toxic-appearing.   HENT:      Head: Normocephalic and atraumatic.      Right Ear: External ear normal.      Left Ear: External ear normal.      Nose: Nose normal.      Mouth/Throat:      " Mouth: Mucous membranes are moist.   Pulmonary:      Effort: Pulmonary effort is normal. No respiratory distress.   Abdominal:      General: Abdomen is flat. There is no distension.      Tenderness: There is abdominal tenderness in the epigastric area.   Musculoskeletal:         General: No signs of injury.   Skin:     Coloration: Skin is not pale.   Neurological:      General: No focal deficit present.      Mental Status: She is alert.         Results Reviewed       Procedure Component Value Units Date/Time    Comprehensive metabolic panel [[id number]] Collected: 03/31/25 0742    Lab Status: Final result Specimen: Blood from Arm, Right Updated: 03/31/25 0809     Sodium 135 mmol/L      Potassium 4.2 mmol/L      Chloride 104 mmol/L      CO2 24 mmol/L      ANION GAP 7 mmol/L      BUN 22 mg/dL      Creatinine 0.79 mg/dL      Glucose 96 mg/dL      Calcium 8.7 mg/dL      AST 18 U/L      ALT 20 U/L      Alkaline Phosphatase 83 U/L      Total Protein 7.1 g/dL      Albumin 4.3 g/dL      Total Bilirubin 0.48 mg/dL      eGFR 88 ml/min/1.73sq m     Narrative:      National Kid[employer] Foundation guidelines for Chronic Kidney Disease (CKD):     Stage 1 with normal or high GFR (GFR > 90 mL/min/1.73 square meters)    Stage 2 Mild CKD (GFR = 60-89 mL/min/1.73 square meters)    Stage 3A Moderate CKD (GFR = 45-59 mL/min/1.73 square meters)    Stage 3B Moderate CKD (GFR = 30-44 mL/min/1.73 square meters)    Stage 4 Severe CKD (GFR = 15-29 mL/min/1.73 square meters)    Stage 5 End Stage CKD (GFR <15 mL/min/1.73 square meters)  Note: GFR calculation is accurate only with a steady state creatinine    Lipase [[id number]]  (Normal) Collected: 03/31/25 0742    Lab Status: Final result Specimen: Blood from Arm, Right Updated: 03/31/25 0809     Lipase 27 u/L     CBC and differential [[id number]]  (Abnormal) Collected: 03/31/25 0742    Lab Status: Final result Specimen: Blood from Arm, Right Updated: 03/31/25 0749     WBC 9.66 Thousand/uL       RBC 4.58 Million/uL      Hemoglobin 11.4 g/dL      Hematocrit 35.8 %      MCV 78 fL      MCH 24.9 pg      MCHC 31.8 g/dL      RDW 16.1 %      MPV 10.0 fL      Platelets 455 Thousands/uL      nRBC 0 /100 WBCs      Segmented % 68 %      Immature Grans % 0 %      Lymphocytes % 20 %      Monocytes % 9 %      Eosinophils Relative 2 %      Basophils Relative 1 %      Absolute Neutrophils 6.52 Thousands/µL      Absolute Immature Grans 0.03 Thousand/uL      Absolute Lymphocytes 1.91 Thousands/µL      Absolute Monocytes 0.91 Thousand/µL      Eosinophils Absolute 0.23 Thousand/µL      Basophils Absolute 0.06 Thousands/µL             No orders to display       Procedures    ED Medication and Procedure Management   Prior to Admission Medications   Prescriptions Last Dose Informant Patient Reported? Taking?   SUMAtriptan (IMITREX) 100 mg tablet   No No   Sig: Take 1 tablet (100 mg total) by mouth once as needed for migraine   Strattera 18 MG capsule   Yes No   Sig: Take 18 mg by mouth every morning   amitriptyline (ELAVIL) 50 mg tablet   No No   Sig: Take 0.5 tablets (25 mg total) by mouth daily at bedtime   amphetamine-dextroamphetamine (ADDERALL) 10 mg tablet   Yes No   Sig: Take 1 tablet by mouth every morning   buPROPion (WELLBUTRIN SR) 200 MG 12 hr tablet   No No   Sig: take 1 tablet by mouth twice a day   cholecalciferol (VITAMIN D3) 1,000 units tablet   No No   Sig: Take 2 tablets (2,000 Units total) by mouth daily   citalopram (CeleXA) 40 mg tablet   Yes No   Sig: Take 40 mg by mouth every morning   lamoTRIgine (LaMICtal) 100 mg tablet   Yes No   Sig: Take 100 mg by mouth every morning   naltrexone (REVIA) 50 mg tablet   No No   Sig: Take 0.5 tablets (25 mg total) by mouth daily for 14 days, THEN 1 tablet (50 mg total) daily for 16 days.   pantoprazole (PROTONIX) 40 mg tablet   No No   Sig: Take 1 tablet (40 mg total) by mouth daily take 1 tablet by mouth twice a day for 2 MONTHS then 1 tablet daily   selenium sulfide  (SELSUN) 2.5 % shampoo   No No   Sig: Apply topically daily as needed for dandruff   valACYclovir (VALTREX) 500 mg tablet   No No   Sig: Take 1 tablet (500 mg total) by mouth daily   zolpidem (AMBIEN) 10 mg tablet   Yes No   Sig: Take 10 mg by mouth every evening      Facility-Administered Medications: None     Discharge Medication List as of 3/31/2025  8:36 AM        START taking these medications    Details   metoclopramide (Reglan) 10 mg tablet Take 1 tablet (10 mg total) by mouth every 6 (six) hours as needed (Nausea and vomiting), Starting Mon 3/31/2025, Normal           CONTINUE these medications which have NOT CHANGED    Details   amitriptyline (ELAVIL) 50 mg tablet Take 0.5 tablets (25 mg total) by mouth daily at bedtime, Starting Fri 3/7/2025, Normal      amphetamine-dextroamphetamine (ADDERALL) 10 mg tablet Take 1 tablet by mouth every morning, Starting Sun 11/28/2021, Historical Med      buPROPion (WELLBUTRIN SR) 200 MG 12 hr tablet take 1 tablet by mouth twice a day, Starting Fri 8/4/2023, Normal      cholecalciferol (VITAMIN D3) 1,000 units tablet Take 2 tablets (2,000 Units total) by mouth daily, Starting Mon 11/14/2022, Normal      citalopram (CeleXA) 40 mg tablet Take 40 mg by mouth every morning, Starting Wed 6/22/2022, Historical Med      lamoTRIgine (LaMICtal) 100 mg tablet Take 100 mg by mouth every morning, Starting Fri 12/24/2021, Historical Med      naltrexone (REVIA) 50 mg tablet Multiple Dosages:Starting Wed 2/15/2023, Until Tue 2/28/2023 at 2359, THEN Starting Wed 3/1/2023, Until Thu 3/16/2023 at 2359Take 0.5 tablets (25 mg total) by mouth daily for 14 days, THEN 1 tablet (50 mg total) daily for 16 days., Normal      pantoprazole (PROTONIX) 40 mg tablet Take 1 tablet (40 mg total) by mouth daily take 1 tablet by mouth twice a day for 2 MONTHS then 1 tablet daily, Starting Fri 3/28/2025, Normal      selenium sulfide (SELSUN) 2.5 % shampoo Apply topically daily as needed for dandruff,  Starting Wed 7/13/2022, Normal      Strattera 18 MG capsule Take 18 mg by mouth every morning, Starting Fri 12/24/2021, Historical Med      SUMAtriptan (IMITREX) 100 mg tablet Take 1 tablet (100 mg total) by mouth once as needed for migraine, Starting Fri 3/7/2025, Normal      valACYclovir (VALTREX) 500 mg tablet Take 1 tablet (500 mg total) by mouth daily, Starting Thu 10/17/2024, Until Fri 3/7/2025, Normal      zolpidem (AMBIEN) 10 mg tablet Take 10 mg by mouth every evening, Starting Mon 1/31/2022, Historical Med           No discharge procedures on file.  ED SEPSIS DOCUMENTATION   Time reflects when diagnosis was documented in both MDM as applicable and the Disposition within this note       Time User Action Codes Description Comment    3/31/2025  7:37 AM Jayson Pryor Add [K21.9] GERD (gastroesophageal reflux disease)                  Jayson Pryor,   03/31/25 1008

## 2025-03-31 NOTE — ED NOTES
Patient resting in bed without distress. Patient reports improvement of symptoms since medication administration.     Allison A Schoener, RN  03/31/25 0855

## 2025-03-31 NOTE — DISCHARGE INSTRUCTIONS
Please contact gastroenterology to see if you can get on their cancellation list.  Please follow-up otherwise as scheduled.  We have also started you on a medication to help with your abdominal discomfort but you should also continue your Protonix.    Thank you for choosing the emergency department at [facility]. We appreciated the opportunity and privilege to address your healthcare needs. We remain available to you should you require additional evaluation or assistance. We value your feedback and would appreciate the opportunity to address anything you identified as an opportunity to improve or where we excelled. If there are colleagues who deserve special recognition, please let us know! We hope you are feeling better soon!    Please also note that sometimes there are subtle abnormalities in your lab values that you may observe when you access your record online.  These are frequently not worrisome and if they are of concern we will have discussed them with you.  However, we always encourage that you discuss any concerns you may have or observe on your record with your primary care provider.   Please also note that while your visit documentation was reviewed prior to completion, voice transcription will occasionally recognize words or grammar differently than what was spoken.

## 2025-05-22 ENCOUNTER — OFFICE VISIT (OUTPATIENT)
Dept: GASTROENTEROLOGY | Facility: CLINIC | Age: 50
End: 2025-05-22

## 2025-05-22 VITALS
SYSTOLIC BLOOD PRESSURE: 138 MMHG | BODY MASS INDEX: 29.35 KG/M2 | HEART RATE: 103 BPM | OXYGEN SATURATION: 99 % | DIASTOLIC BLOOD PRESSURE: 83 MMHG | WEIGHT: 182.6 LBS | HEIGHT: 66 IN | TEMPERATURE: 98.1 F

## 2025-05-22 DIAGNOSIS — R11.2 NAUSEA AND VOMITING, UNSPECIFIED VOMITING TYPE: Primary | ICD-10-CM

## 2025-05-22 DIAGNOSIS — K59.09 INTERMITTENT CONSTIPATION: ICD-10-CM

## 2025-05-22 DIAGNOSIS — K21.9 GASTROESOPHAGEAL REFLUX DISEASE, UNSPECIFIED WHETHER ESOPHAGITIS PRESENT: ICD-10-CM

## 2025-05-22 DIAGNOSIS — Z12.11 SCREENING FOR COLON CANCER: ICD-10-CM

## 2025-05-22 DIAGNOSIS — D50.9 MICROCYTIC ANEMIA: ICD-10-CM

## 2025-05-22 RX ORDER — PANTOPRAZOLE SODIUM 40 MG/1
40 TABLET, DELAYED RELEASE ORAL 2 TIMES DAILY
Qty: 60 TABLET | Refills: 5 | Status: SHIPPED | OUTPATIENT
Start: 2025-05-22

## 2025-05-22 RX ORDER — SODIUM CHLORIDE, SODIUM LACTATE, POTASSIUM CHLORIDE, CALCIUM CHLORIDE 600; 310; 30; 20 MG/100ML; MG/100ML; MG/100ML; MG/100ML
125 INJECTION, SOLUTION INTRAVENOUS CONTINUOUS
OUTPATIENT
Start: 2025-05-22

## 2025-05-22 NOTE — PATIENT INSTRUCTIONS
Increase pantoprazole to twice daily.   High fiber diet and excellent hydration.   If needed, start on miralax daily.     EGD and colonoscopy because of your symptoms and low blood count.   Blood work and ultrasound.

## 2025-05-22 NOTE — PROGRESS NOTES
Name: Juvencio Maldonado      : 1975      MRN: [id number]  Encounter Provider: Amy Patton PA-C  Encounter Date: 2025   Encounter department: [facility] [place]  Assessment & Plan  Nausea and vomiting, unspecified vomiting type  Patient dealing with treatment refractory upper GI symptoms to include recurrent nausea and nonbloody emesis as well as treatment refractory heartburn.  Differential includes gastritis, PUD, dysmotility, GOO, functional dyspepsia, biliary pathology, etc.    Increase PPI to twice daily dosing.  Small frequent meals.  She unfortunately has side effects to Zofran and Reglan.  Check EGD to evaluate for luminal pathology.  Ultrasound ordered for biliary pathology.    I discussed informed consent with the patient. The risks/benefits/alternatives of the procedure were discussed with the patient. Risks included, but not limited to, infection, bleeding, perforation, injury to organs in the abdomen, missed lesion and incomplete procedure were discussed. Patient was agreeable.       Gastroesophageal reflux disease, unspecified whether esophagitis present  History of such, having treatment refractory symptoms despite once daily PPI.  Increase to twice daily dosing now.    Recommend diet and lifestyle modifications for GERD.  This includes avoiding spicy, saucy, greasy/oily foods, citrus, EtOH, NSAIDs, tobacco.  Avoid eating within 2 to 3 hours of bed.  Elevating height of bed 6 inches on blocks may be beneficial.  Weight loss would likely be beneficial.  Orders:    pantoprazole (PROTONIX) 40 mg tablet; Take 1 tablet (40 mg total) by mouth in the morning and 1 tablet (40 mg total) before bedtime. take 1 tablet by mouth twice a day for 2 MONTHS then 1 tablet daily.    Iron Panel (Includes Ferritin, Iron Sat%, Iron, and TIBC); Future    Tissue Transglutaminase, IgA; Future    IgA; Future    EGD; Future    Microcytic anemia  Noted on recent blood work in  "03/2025.  Check iron panel now.  Check celiac serologies for completeness.  No bleeding diatheses endorsed.  I did review with patient that bidirectional endoscopic evaluation is typically warranted in the setting of microcytic anemia and we will proceed for this reason and for the GI indications as listed above.  Orders:    Iron Panel (Includes Ferritin, Iron Sat%, Iron, and TIBC); Future    Tissue Transglutaminase, IgA; Future    IgA; Future    Colonoscopy; Future    polyethylene glycol (GOLYTELY) 4000 mL solution; Take 4,000 mL by mouth once for 1 dose    Screening for colon cancer  Patient did have a diagnostic colonoscopy in 2020 for rectal bleeding.  Anal papillae and skin tags were identified at that time.  Endoscopist recommended a 5-year recall for screening purposes.  She is due at this time and we will proceed with a colonoscopy at the time of her upper endoscopy.  GoLytely prep was sent to the pharmacy for patient.    Orders:    Colonoscopy; Future    Intermittent constipation  Patient is having [place] 2-3 stools.  Recommend initiating fiber supplement and ensuring excellent hydration.  If needed, she can add MiraLAX into her regimen on an as needed or daily basis.       We will follow-up after bidirectional endoscopic evaluation is completed.    History of Present Illness   Juvencio Maldonado is a 49 y.o. female who presents for evaluation of nausea vomiting.  Past medical history significant for migraine, GERD, ADHD, MDD, insomnia, prediabetes, BMI 29.  HPI  History obtained from: patient    Pt is new to clinic. \"Pukes\" every night.  No hematemesis.  Ongoing for 2 years or so.  Stomach hurts all day long. Feels like she is not digesting food the way she should. Does have heartburn. Takes PPI daily. No dysphagia or odynphagia. No weight loss over past 6 months. Feels like she is eating to try to stop the sour taste in her mouth. Tried reglan but gave her anxiety, palpitations.      Was in the ER in " "03/2025 for these symptoms.    Pertaining to bowels, having BM every other day or so. Having bristol 2-3 stool. Some straining. No BRBPR or melena. Usually is dark. No family hx of CRC in [place], paternal grandfather had CRC.     No menstrual cycle, no nosebleeds, no large bruises, etc.    NSAIDs: PRN   Etoh: none   Tobacco: none   Cannabis: none     03/2025: Hb 11.4, MCV 78, platelets 455, BUN 22, creatinine 0.79, AST 18, ALT 20, ALP 83, albumin 4.3, T. bili 0.48, lipase 27    Endoscopic history:   Colon: 03/2020: Hypertrophied anal papillae, skin tags; repeat in 5 years    Review of Systems A complete review of systems is negative other than that noted above in the HPI.    Past Medical History   Past Medical History[1]  Past Surgical History[2]  Family History[3]   reports that she has never smoked. She has never used smokeless tobacco. She reports that she does not drink alcohol and does not use drugs.  Current Outpatient Medications   Medication Instructions    amitriptyline (ELAVIL) 25 mg, Oral, Daily at bedtime    amphetamine-dextroamphetamine (ADDERALL) 10 mg tablet 1 tablet, Every morning    buPROPion (WELLBUTRIN SR) 200 mg, Oral, 2 times daily    cholecalciferol (VITAMIN D3) 2,000 Units, Oral, Daily    citalopram (CELEXA) 40 mg, Every morning    lamoTRIgine (LAMICTAL) 100 mg, Every morning    pantoprazole (PROTONIX) 40 mg, Oral, 2 times daily, take 1 tablet by mouth twice a day for 2 MONTHS then 1 tablet daily    polyethylene glycol (GOLYTELY) 4000 mL solution 4,000 mL, Oral, Once    selenium sulfide (SELSUN) 2.5 % shampoo Topical, Daily PRN    Strattera 18 mg, Every morning    SUMAtriptan (IMITREX) 100 mg, Oral, Once as needed    valACYclovir (VALTREX) 500 mg, Oral, Daily    zolpidem (AMBIEN) 10 mg, Every evening   Allergies[4]      Objective   /83 (BP Location: Left arm, Patient Position: Sitting, Cuff Size: Standard)   Pulse 103   Temp 98.1 °F (36.7 °C) (Temporal)   Ht 5' 6\" (1.676 m)   Wt 82.8 kg " (182 lb 9.6 oz)   SpO2 99%   BMI 29.47 kg/m²     Physical Exam  Vitals and nursing note reviewed.   Constitutional:       General: She is not in acute distress.     Appearance: She is well-developed.   HENT:      Head: Normocephalic and atraumatic.     Eyes:      Conjunctiva/sclera: Conjunctivae normal.       Cardiovascular:      Rate and Rhythm: Normal rate.      Heart sounds: No murmur heard.  Pulmonary:      Effort: Pulmonary effort is normal. No respiratory distress.      Breath sounds: Normal breath sounds.   Abdominal:      General: There is no distension.      Palpations: Abdomen is soft.      Tenderness: There is no abdominal tenderness. There is no guarding.     Musculoskeletal:         General: No swelling.     Skin:     General: Skin is warm and dry.      Coloration: Skin is not jaundiced.     Neurological:      General: No focal deficit present.      Mental Status: She is alert.     Psychiatric:         Mood and Affect: Mood normal.          Lab Results: I personally reviewed relevant lab results. CBC/BMP: No new results in last 24 hours. , Creatinine Clearance: CrCl cannot be calculated (Patient's most recent lab result is older than the maximum 7 days allowed.)., LFTs: No new results in last 24 hours.     Radiology Results Review: I have reviewed radiology reports from [facility] including: CT abdomen/pelvis and procedure reports.    **Please note:  Dictation voice to text software may have been used in the creation of this record.  Occasional wrong word or “sound alike” substitutions may have occurred due to the inherent limitations of voice recognition software.  Read the chart carefully and recognize, using context, where substitutions have occurred.**       [1]   Past Medical History:  Diagnosis Date    ADHD     Anxiety 1990    All my life    Anxiety and depression     Depression     All my life    Diabetes mellitus during pregnancy     GERD (gastroesophageal reflux disease) 2003    Over 14 years     Herpes labialis     Resolved 6/1/2016    [2]   Past Surgical History:  Procedure Laterality Date    TONSILLECTOMY AND ADENOIDECTOMY     [3]   Family History  Problem Relation Name Age of Onset    Anxiety disorder Mother Jessica         NOS    Depression Mother Jessica     Diabetes Father      No Known Problems Sister xenia     No Known Problems Sister omary     No Known Problems Daughter alexi     No Known Problems Daughter stephany     No Known Problems Daughter jarielys     No Known Problems Maternal Grandmother      No Known Problems Maternal Grandfather      No Known Problems Paternal Grandmother      No Known Problems Paternal Grandfather      Diabetes Brother      Chromosomal disorder Son      No Known Problems Maternal Aunt lisa     No Known Problems Paternal Aunt      No Known Problems Paternal Aunt      No Known Problems Paternal Aunt      No Known Problems Paternal Aunt      No Known Problems Paternal Aunt      No Known Problems Paternal Aunt      No Known Problems Paternal Aunt      No Known Problems Paternal Aunt      No Known Problems Paternal Aunt      No Known Problems Paternal Aunt      No Known Problems Paternal Aunt      No Known Problems Paternal Aunt      No Known Problems Paternal Aunt      No Known Problems Paternal Aunt      Breast cancer Neg Hx      BRCA1 Positive Neg Hx      BRCA1 Negative Neg Hx      Ovarian cancer Neg Hx      BRCA 1/2 Neg Hx      Endometrial cancer Neg Hx      Colon cancer Neg Hx      Breast cancer additional onset Neg Hx      BRCA2 Negative Neg Hx      BRCA2 Positive Neg Hx     [4]   Allergies  Allergen Reactions    Iodine - Food Allergy Other (See Comments)     unknown    Zofran [Ondansetron] Itching

## 2025-05-22 NOTE — H&P (VIEW-ONLY)
Name: Juvencio Maldonado      : 1975      MRN: [id number]  Encounter Provider: Amy Patton PA-C  Encounter Date: 2025   Encounter department: [facility]OENTEROLOG[facility] [place]  Assessment & Plan  Nausea and vomiting, unspecified vomiting type  Patient dealing with treatment refractory upper GI symptoms to include recurrent nausea and nonbloody emesis as well as treatment refractory heartburn.  Differential includes gastritis, PUD, dysmotility, GOO, functional dyspepsia, biliary pathology, etc.    Increase PPI to twice daily dosing.  Small frequent meals.  She unfortunately has side effects to Zofran and Reglan.  Check EGD to evaluate for luminal pathology.  Ultrasound ordered for biliary pathology.    I discussed informed consent with the patient. The risks/benefits/alternatives of the procedure were discussed with the patient. Risks included, but not limited to, infection, bleeding, perforation, injury to organs in the abdomen, missed lesion and incomplete procedure were discussed. Patient was agreeable.       Gastroesophageal reflux disease, unspecified whether esophagitis present  History of such, having treatment refractory symptoms despite once daily PPI.  Increase to twice daily dosing now.    Recommend diet and lifestyle modifications for GERD.  This includes avoiding spicy, saucy, greasy/oily foods, citrus, EtOH, NSAIDs, tobacco.  Avoid eating within 2 to 3 hours of bed.  Elevating height of bed 6 inches on blocks may be beneficial.  Weight loss would likely be beneficial.  Orders:    pantoprazole (PROTONIX) 40 mg tablet; Take 1 tablet (40 mg total) by mouth in the morning and 1 tablet (40 mg total) before bedtime. take 1 tablet by mouth twice a day for 2 MONTHS then 1 tablet daily.    Iron Panel (Includes Ferritin, Iron Sat%, Iron, and TIBC); Future    Tissue Transglutaminase, IgA; Future    IgA; Future    EGD; Future    Microcytic anemia  Noted on recent blood work in  "03/2025.  Check iron panel now.  Check celiac serologies for completeness.  No bleeding diatheses endorsed.  I did review with patient that bidirectional endoscopic evaluation is typically warranted in the setting of microcytic anemia and we will proceed for this reason and for the GI indications as listed above.  Orders:    Iron Panel (Includes Ferritin, Iron Sat%, Iron, and TIBC); Future    Tissue Transglutaminase, IgA; Future    IgA; Future    Colonoscopy; Future    polyethylene glycol (GOLYTELY) 4000 mL solution; Take 4,000 mL by mouth once for 1 dose    Screening for colon cancer  Patient did have a diagnostic colonoscopy in 2020 for rectal bleeding.  Anal papillae and skin tags were identified at that time.  Endoscopist recommended a 5-year recall for screening purposes.  She is due at this time and we will proceed with a colonoscopy at the time of her upper endoscopy.  GoLytely prep was sent to the pharmacy for patient.    Orders:    Colonoscopy; Future    Intermittent constipation  Patient is having [place] 2-3 stools.  Recommend initiating fiber supplement and ensuring excellent hydration.  If needed, she can add MiraLAX into her regimen on an as needed or daily basis.       We will follow-up after bidirectional endoscopic evaluation is completed.    History of Present Illness   Juvencio Maldonado is a 49 y.o. female who presents for evaluation of nausea vomiting.  Past medical history significant for migraine, GERD, ADHD, MDD, insomnia, prediabetes, BMI 29.  HPI  History obtained from: patient    Pt is new to clinic. \"Pukes\" every night.  No hematemesis.  Ongoing for 2 years or so.  Stomach hurts all day long. Feels like she is not digesting food the way she should. Does have heartburn. Takes PPI daily. No dysphagia or odynphagia. No weight loss over past 6 months. Feels like she is eating to try to stop the sour taste in her mouth. Tried reglan but gave her anxiety, palpitations.      Was in the ER in " "03/2025 for these symptoms.    Pertaining to bowels, having BM every other day or so. Having bristol 2-3 stool. Some straining. No BRBPR or melena. Usually is dark. No family hx of CRC in [place], paternal grandfather had CRC.     No menstrual cycle, no nosebleeds, no large bruises, etc.    NSAIDs: PRN   Etoh: none   Tobacco: none   Cannabis: none     03/2025: Hb 11.4, MCV 78, platelets 455, BUN 22, creatinine 0.79, AST 18, ALT 20, ALP 83, albumin 4.3, T. bili 0.48, lipase 27    Endoscopic history:   Colon: 03/2020: Hypertrophied anal papillae, skin tags; repeat in 5 years    Review of Systems A complete review of systems is negative other than that noted above in the HPI.    Past Medical History   Past Medical History[1]  Past Surgical History[2]  Family History[3]   reports that she has never smoked. She has never used smokeless tobacco. She reports that she does not drink alcohol and does not use drugs.  Current Outpatient Medications   Medication Instructions    amitriptyline (ELAVIL) 25 mg, Oral, Daily at bedtime    amphetamine-dextroamphetamine (ADDERALL) 10 mg tablet 1 tablet, Every morning    buPROPion (WELLBUTRIN SR) 200 mg, Oral, 2 times daily    cholecalciferol (VITAMIN D3) 2,000 Units, Oral, Daily    citalopram (CELEXA) 40 mg, Every morning    lamoTRIgine (LAMICTAL) 100 mg, Every morning    pantoprazole (PROTONIX) 40 mg, Oral, 2 times daily, take 1 tablet by mouth twice a day for 2 MONTHS then 1 tablet daily    polyethylene glycol (GOLYTELY) 4000 mL solution 4,000 mL, Oral, Once    selenium sulfide (SELSUN) 2.5 % shampoo Topical, Daily PRN    Strattera 18 mg, Every morning    SUMAtriptan (IMITREX) 100 mg, Oral, Once as needed    valACYclovir (VALTREX) 500 mg, Oral, Daily    zolpidem (AMBIEN) 10 mg, Every evening   Allergies[4]      Objective   /83 (BP Location: Left arm, Patient Position: Sitting, Cuff Size: Standard)   Pulse 103   Temp 98.1 °F (36.7 °C) (Temporal)   Ht 5' 6\" (1.676 m)   Wt 82.8 kg " (182 lb 9.6 oz)   SpO2 99%   BMI 29.47 kg/m²     Physical Exam  Vitals and nursing note reviewed.   Constitutional:       General: She is not in acute distress.     Appearance: She is well-developed.   HENT:      Head: Normocephalic and atraumatic.     Eyes:      Conjunctiva/sclera: Conjunctivae normal.       Cardiovascular:      Rate and Rhythm: Normal rate.      Heart sounds: No murmur heard.  Pulmonary:      Effort: Pulmonary effort is normal. No respiratory distress.      Breath sounds: Normal breath sounds.   Abdominal:      General: There is no distension.      Palpations: Abdomen is soft.      Tenderness: There is no abdominal tenderness. There is no guarding.     Musculoskeletal:         General: No swelling.     Skin:     General: Skin is warm and dry.      Coloration: Skin is not jaundiced.     Neurological:      General: No focal deficit present.      Mental Status: She is alert.     Psychiatric:         Mood and Affect: Mood normal.          Lab Results: I personally reviewed relevant lab results. CBC/BMP: No new results in last 24 hours. , Creatinine Clearance: CrCl cannot be calculated (Patient's most recent lab result is older than the maximum 7 days allowed.)., LFTs: No new results in last 24 hours.     Radiology Results Review: I have reviewed radiology reports from [facility] including: CT abdomen/pelvis and procedure reports.    **Please note:  Dictation voice to text software may have been used in the creation of this record.  Occasional wrong word or “sound alike” substitutions may have occurred due to the inherent limitations of voice recognition software.  Read the chart carefully and recognize, using context, where substitutions have occurred.**       [1]   Past Medical History:  Diagnosis Date    ADHD     Anxiety 1990    All my life    Anxiety and depression     Depression     All my life    Diabetes mellitus during pregnancy     GERD (gastroesophageal reflux disease) 2003    Over 14 years     Herpes labialis     Resolved 6/1/2016    [2]   Past Surgical History:  Procedure Laterality Date    TONSILLECTOMY AND ADENOIDECTOMY     [3]   Family History  Problem Relation Name Age of Onset    Anxiety disorder Mother Jessica         NOS    Depression Mother Jessica     Diabetes Father      No Known Problems Sister xenia     No Known Problems Sister omary     No Known Problems Daughter alexi     No Known Problems Daughter stephany     No Known Problems Daughter jarielys     No Known Problems Maternal Grandmother      No Known Problems Maternal Grandfather      No Known Problems Paternal Grandmother      No Known Problems Paternal Grandfather      Diabetes Brother      Chromosomal disorder Son      No Known Problems Maternal Aunt lisa     No Known Problems Paternal Aunt      No Known Problems Paternal Aunt      No Known Problems Paternal Aunt      No Known Problems Paternal Aunt      No Known Problems Paternal Aunt      No Known Problems Paternal Aunt      No Known Problems Paternal Aunt      No Known Problems Paternal Aunt      No Known Problems Paternal Aunt      No Known Problems Paternal Aunt      No Known Problems Paternal Aunt      No Known Problems Paternal Aunt      No Known Problems Paternal Aunt      No Known Problems Paternal Aunt      Breast cancer Neg Hx      BRCA1 Positive Neg Hx      BRCA1 Negative Neg Hx      Ovarian cancer Neg Hx      BRCA 1/2 Neg Hx      Endometrial cancer Neg Hx      Colon cancer Neg Hx      Breast cancer additional onset Neg Hx      BRCA2 Negative Neg Hx      BRCA2 Positive Neg Hx     [4]   Allergies  Allergen Reactions    Iodine - Food Allergy Other (See Comments)     unknown    Zofran [Ondansetron] Itching

## 2025-06-06 ENCOUNTER — OFFICE VISIT (OUTPATIENT)
Dept: FAMILY MEDICINE CLINIC | Facility: CLINIC | Age: 50
End: 2025-06-06
Payer: COMMERCIAL

## 2025-06-06 VITALS
BODY MASS INDEX: 29.41 KG/M2 | DIASTOLIC BLOOD PRESSURE: 70 MMHG | RESPIRATION RATE: 18 BRPM | OXYGEN SATURATION: 99 % | SYSTOLIC BLOOD PRESSURE: 130 MMHG | HEIGHT: 66 IN | HEART RATE: 111 BPM | WEIGHT: 183 LBS | TEMPERATURE: 98.2 F

## 2025-06-06 DIAGNOSIS — J01.00 ACUTE NON-RECURRENT MAXILLARY SINUSITIS: Primary | ICD-10-CM

## 2025-06-06 PROCEDURE — 99214 OFFICE O/P EST MOD 30 MIN: CPT | Performed by: FAMILY MEDICINE

## 2025-06-06 RX ORDER — AZITHROMYCIN 250 MG/1
TABLET, FILM COATED ORAL
Qty: 6 TABLET | Refills: 0 | Status: SHIPPED | OUTPATIENT
Start: 2025-06-06 | End: 2025-06-10

## 2025-06-06 RX ORDER — METHYLPREDNISOLONE 4 MG/1
TABLET ORAL
Qty: 21 EACH | Refills: 0 | Status: SHIPPED | OUTPATIENT
Start: 2025-06-06

## 2025-06-06 NOTE — PROGRESS NOTES
":  Assessment & Plan  Acute non-recurrent maxillary sinusitis    Orders:    azithromycin (ZITHROMAX) 250 mg tablet; Take 2 tablets today then 1 tablet daily x 4 days    methylPREDNISolone 4 MG tablet therapy pack; Use as directed on package        History of Present Illness     Juvencio Maldonado is a 49 y.o. female   She has cough and sore throat.  She has seasonal allergies, but they don't feel like this.  Her cough is non-productive.  He cough is worse at night.  She denies wheezing.  She has no ill contacts. She has taken Robitussin without relief.      Sore Throat   Associated symptoms include coughing.   Cough  Associated symptoms include a sore throat.     Review of Systems   HENT:  Positive for sore throat.    Respiratory:  Positive for cough.    All other systems reviewed and are negative.    Objective   /70 (BP Location: Left arm, Patient Position: Sitting, Cuff Size: Large)   Pulse (!) 111   Temp 98.2 °F (36.8 °C) (Temporal)   Resp 18   Ht 5' 6\" (1.676 m)   Wt 83 kg (183 lb)   LMP  (LMP Unknown)   SpO2 99%   BMI 29.54 kg/m²      Physical Exam  Vitals and nursing note reviewed.   Constitutional:       Appearance: She is well-developed.   HENT:      Mouth/Throat:      Tonsils: No tonsillar exudate or tonsillar abscesses.     Eyes:      Conjunctiva/sclera: Conjunctivae normal.      Pupils: Pupils are equal, round, and reactive to light.       Cardiovascular:      Rate and Rhythm: Normal rate and regular rhythm.      Heart sounds: Normal heart sounds.   Pulmonary:      Effort: Pulmonary effort is normal.      Breath sounds: Normal breath sounds.   Abdominal:      General: Bowel sounds are normal.      Palpations: Abdomen is soft.     Musculoskeletal:      Cervical back: Normal range of motion and neck supple.     Skin:     General: Skin is warm.      Capillary Refill: Capillary refill takes less than 2 seconds.     Neurological:      General: No focal deficit present.      Mental Status: She is " oriented to person, place, and time.     Psychiatric:         Mood and Affect: Mood normal.           Answers submitted by the patient for this visit:  Annual Physical (Submitted on 6/5/2025)  Diet/Nutrition choices: poor diet, frequent junk food  Exercise choices: no formal exercise  Sleep choices: 4-6 hours of sleep on average, snores loudly  Hearing choices: normal hearing bilateral ears  Vision choices: vision problems, wears glasses  Dental choices: no dental visits for > 1 year, brushes teeth once daily  Do you currently have an OB/GYN provider that you routinely follow with?: No  Any history of sexual transmitted disease/infection?: No  Contraception: IUD placement  Do you have an advance directive/living will?: No  Do you have a durable power of  (POA)?: No

## 2025-06-07 ENCOUNTER — PATIENT MESSAGE (OUTPATIENT)
Dept: GASTROENTEROLOGY | Facility: CLINIC | Age: 50
End: 2025-06-07

## 2025-06-07 DIAGNOSIS — Z12.11 SCREENING FOR COLON CANCER: ICD-10-CM

## 2025-06-07 DIAGNOSIS — D50.9 MICROCYTIC ANEMIA: Primary | ICD-10-CM

## 2025-06-19 ENCOUNTER — APPOINTMENT (OUTPATIENT)
Dept: LAB | Facility: CLINIC | Age: 50
End: 2025-06-19
Payer: COMMERCIAL

## 2025-06-19 ENCOUNTER — TELEPHONE (OUTPATIENT)
Dept: MAMMOGRAPHY | Facility: HOSPITAL | Age: 50
End: 2025-06-19

## 2025-06-19 DIAGNOSIS — D50.9 MICROCYTIC ANEMIA: ICD-10-CM

## 2025-06-19 DIAGNOSIS — K21.9 GASTROESOPHAGEAL REFLUX DISEASE, UNSPECIFIED WHETHER ESOPHAGITIS PRESENT: ICD-10-CM

## 2025-06-19 LAB — IGA SERPL-MCNC: 145 MG/DL (ref 66–433)

## 2025-06-19 PROCEDURE — 36415 COLL VENOUS BLD VENIPUNCTURE: CPT

## 2025-06-19 PROCEDURE — 82784 ASSAY IGA/IGD/IGG/IGM EACH: CPT

## 2025-06-19 PROCEDURE — 86364 TISS TRNSGLTMNASE EA IG CLAS: CPT

## 2025-06-20 ENCOUNTER — HOSPITAL ENCOUNTER (OUTPATIENT)
Dept: PERIOP | Facility: HOSPITAL | Age: 50
Setting detail: OUTPATIENT SURGERY
Discharge: HOME | End: 2025-06-20
Attending: PHYSICIAN ASSISTANT | Admitting: STUDENT IN AN ORGANIZED HEALTH CARE EDUCATION/TRAINING PROGRAM
Payer: COMMERCIAL

## 2025-06-20 ENCOUNTER — ANESTHESIA (OUTPATIENT)
Dept: PERIOP | Facility: HOSPITAL | Age: 50
End: 2025-06-20
Payer: COMMERCIAL

## 2025-06-20 ENCOUNTER — ANESTHESIA EVENT (OUTPATIENT)
Dept: PERIOP | Facility: HOSPITAL | Age: 50
End: 2025-06-20
Payer: COMMERCIAL

## 2025-06-20 VITALS
BODY MASS INDEX: 29.41 KG/M2 | TEMPERATURE: 97.5 F | OXYGEN SATURATION: 100 % | HEART RATE: 91 BPM | SYSTOLIC BLOOD PRESSURE: 134 MMHG | WEIGHT: 183 LBS | HEIGHT: 66 IN | DIASTOLIC BLOOD PRESSURE: 86 MMHG | RESPIRATION RATE: 16 BRPM

## 2025-06-20 DIAGNOSIS — K21.9 GASTROESOPHAGEAL REFLUX DISEASE, UNSPECIFIED WHETHER ESOPHAGITIS PRESENT: ICD-10-CM

## 2025-06-20 DIAGNOSIS — Z12.11 SCREENING FOR COLON CANCER: ICD-10-CM

## 2025-06-20 DIAGNOSIS — D50.9 MICROCYTIC ANEMIA: ICD-10-CM

## 2025-06-20 LAB
EXT PREGNANCY TEST URINE: NEGATIVE
EXT. CONTROL: NORMAL

## 2025-06-20 PROCEDURE — 88305 TISSUE EXAM BY PATHOLOGIST: CPT | Performed by: PATHOLOGY

## 2025-06-20 PROCEDURE — 88341 IMHCHEM/IMCYTCHM EA ADD ANTB: CPT | Performed by: PATHOLOGY

## 2025-06-20 PROCEDURE — 81025 URINE PREGNANCY TEST: CPT | Performed by: ANESTHESIOLOGY

## 2025-06-20 PROCEDURE — 88342 IMHCHEM/IMCYTCHM 1ST ANTB: CPT | Performed by: PATHOLOGY

## 2025-06-20 RX ORDER — PROPOFOL 10 MG/ML
INJECTION, EMULSION INTRAVENOUS AS NEEDED
Status: DISCONTINUED | OUTPATIENT
Start: 2025-06-20 | End: 2025-06-20

## 2025-06-20 RX ORDER — SODIUM CHLORIDE, SODIUM LACTATE, POTASSIUM CHLORIDE, CALCIUM CHLORIDE 600; 310; 30; 20 MG/100ML; MG/100ML; MG/100ML; MG/100ML
INJECTION, SOLUTION INTRAVENOUS CONTINUOUS PRN
Status: DISCONTINUED | OUTPATIENT
Start: 2025-06-20 | End: 2025-06-20

## 2025-06-20 RX ORDER — PROPOFOL 10 MG/ML
INJECTION, EMULSION INTRAVENOUS CONTINUOUS PRN
Status: DISCONTINUED | OUTPATIENT
Start: 2025-06-20 | End: 2025-06-20

## 2025-06-20 RX ORDER — SODIUM CHLORIDE, SODIUM LACTATE, POTASSIUM CHLORIDE, CALCIUM CHLORIDE 600; 310; 30; 20 MG/100ML; MG/100ML; MG/100ML; MG/100ML
20 INJECTION, SOLUTION INTRAVENOUS CONTINUOUS
Status: CANCELLED | OUTPATIENT
Start: 2025-06-20

## 2025-06-20 RX ORDER — SODIUM CHLORIDE, SODIUM LACTATE, POTASSIUM CHLORIDE, CALCIUM CHLORIDE 600; 310; 30; 20 MG/100ML; MG/100ML; MG/100ML; MG/100ML
125 INJECTION, SOLUTION INTRAVENOUS CONTINUOUS
Status: DISCONTINUED | OUTPATIENT
Start: 2025-06-20 | End: 2025-06-24 | Stop reason: HOSPADM

## 2025-06-20 RX ADMIN — SODIUM CHLORIDE, SODIUM LACTATE, POTASSIUM CHLORIDE, AND CALCIUM CHLORIDE: .6; .31; .03; .02 INJECTION, SOLUTION INTRAVENOUS at 10:35

## 2025-06-20 RX ADMIN — PROPOFOL 150 MG: 10 INJECTION, EMULSION INTRAVENOUS at 10:41

## 2025-06-20 RX ADMIN — PROPOFOL 100 MCG/KG/MIN: 10 INJECTION, EMULSION INTRAVENOUS at 10:41

## 2025-06-20 RX ADMIN — SODIUM CHLORIDE, SODIUM LACTATE, POTASSIUM CHLORIDE, AND CALCIUM CHLORIDE 125 ML/HR: .6; .31; .03; .02 INJECTION, SOLUTION INTRAVENOUS at 08:55

## 2025-06-20 NOTE — ANESTHESIA POSTPROCEDURE EVALUATION
Post-Op Assessment Note    CV Status:  Stable  Pain Score: 0    Pain management: adequate       Mental Status:  Alert and awake   Hydration Status:  Euvolemic   PONV Controlled:  Controlled   Airway Patency:  Patent     Post Op Vitals Reviewed: Yes    No anethesia notable event occurred.    Staff: CRNA           Last Filed PACU Vitals:  Vitals Value Taken Time   Temp 97.7    Pulse 106 06/20/25 11:09   /56    Resp 16 06/20/25 11:09   SpO2 96 % 06/20/25 11:09   Vitals shown include unfiled device data.

## 2025-06-20 NOTE — ANESTHESIA PREPROCEDURE EVALUATION
Procedure:  COLONOSCOPY  EGD    Relevant Problems   CARDIO   (+) Common migraine without aura      NEURO/PSYCH   (+) Anxiety   (+) Common migraine without aura   (+) Depression, major, in remission (HCC)        Physical Exam    Airway     Mallampati score: II  TM Distance: >3 FB  Neck ROM: full      Cardiovascular      Dental       Pulmonary      Neurological      Other Findings  post-pubertal.      Anesthesia Plan  ASA Score- 2     Anesthesia Type- IV sedation with anesthesia with ASA Monitors.         Additional Monitors:     Airway Plan:            Plan Factors-Exercise tolerance (METS): >4 METS.    Chart reviewed.   Existing labs reviewed. Patient summary reviewed.                  Induction-     Postoperative Plan- .   Monitoring Plan - Monitoring plan - standard ASA monitoring          Informed Consent- Anesthetic plan and risks discussed with patient.  I personally reviewed this patient with the CRNA. Discussed and agreed on the Anesthesia Plan with the CRNA..      NPO Status:  Vitals Value Taken Time   Date of last liquid 06/20/25 06/20/25 08:40   Time of last liquid 0300 06/20/25 08:40   Date of last solid 06/18/25 06/20/25 08:40   Time of last solid 1500 06/20/25 08:40

## 2025-06-20 NOTE — INTERVAL H&P NOTE
H&P reviewed. After examining the patient I find no changes in the patients condition since the H&P had been written.    Vitals:    06/20/25 0840   BP: 139/72   Pulse: 95   Resp: 18   Temp: (!) 96.7 °F (35.9 °C)   SpO2: 99%

## 2025-06-20 NOTE — ANESTHESIA POSTPROCEDURE EVALUATION
Post-Op Assessment Note    CV Status:  Stable    Pain management: adequate       Mental Status:  Alert and awake   Hydration Status:  Euvolemic   PONV Controlled:  Controlled   Airway Patency:  Patent     Post Op Vitals Reviewed: Yes    No anethesia notable event occurred.    Staff: Anesthesiologist           Last Filed PACU Vitals:  Vitals Value Taken Time   Temp 97.6 °F (36.4 °C) 06/20/25 11:23   Pulse 97 06/20/25 11:26   /96 06/20/25 11:23   Resp 24 06/20/25 11:26   SpO2 100 % 06/20/25 11:26   Vitals shown include unfiled device data.    Modified Ting:     Vitals Value Taken Time   Activity 2 06/20/25 11:23   Respiration 2 06/20/25 11:23   Circulation 2 06/20/25 11:23   Consciousness 2 06/20/25 11:23   Oxygen Saturation 2 06/20/25 11:23     Modified Ting Score: 10

## 2025-06-23 ENCOUNTER — HOSPITAL ENCOUNTER (OUTPATIENT)
Dept: ULTRASOUND IMAGING | Facility: HOSPITAL | Age: 50
Discharge: HOME | End: 2025-06-23
Payer: COMMERCIAL

## 2025-06-23 DIAGNOSIS — R10.13 EPIGASTRIC PAIN: ICD-10-CM

## 2025-06-23 PROCEDURE — 76705 ECHO EXAM OF ABDOMEN: CPT

## 2025-06-24 ENCOUNTER — NURSE TRIAGE (OUTPATIENT)
Age: 50
End: 2025-06-24

## 2025-06-24 DIAGNOSIS — R11.2 NAUSEA AND VOMITING, UNSPECIFIED VOMITING TYPE: Primary | ICD-10-CM

## 2025-06-24 NOTE — TELEPHONE ENCOUNTER
The scopes showed a hiatal hernia and diverticulosis. These findings would not account for pt's acute and severe symptoms. I did review back to my initial consult note and I did indicate pt was having nausea and vomiting at that time as well, which was prior to procedure.    If pt is having difficulty tolerating PO, I would recommend CT A/P now to ensure no post-procedure complications.     If she feels symptoms are manageable, I suppose it is always possible this could be a reaction to the preparation or perhaps initial symptoms could perhaps have been related to anesthesia, though generally people tend to do okay. We could check gastric emptying study if she is tolerating oral intake.

## 2025-06-24 NOTE — TELEPHONE ENCOUNTER
REASON FOR CONVERSATION: GERD    SYMPTOMS: Vomiting food hours after eating- can eat at 3pm and vomiting at 10 pm, waking up at night vomiting food as well, upper abdominal pain like being punched in stomach, bloating/ fullness after eating     OTHER HEALTH INFORMATION: pantoprazole is helping her with the sour bitterness in mouth   Eating small meals throughout the day     PROTOCOL DISPOSITION: Next Available Appointment and 72 Hour Provider Response    CARE ADVICE PROVIDED: defer to provider     PRACTICE FOLLOW-UP: Please advise           Reason for Disposition   The patient has upper abdominal fullness after meals    Answer Assessment - Initial Assessment Questions  1. Do you have a history of GERD?  Yes   2. When did your symptoms start? Please describe your symptoms and how often you experience these symptoms.  Ongoing but worsening   3. Are you taking any acid reducing medications currently such as: Prilosec (Omeprazole), Protonix (Pantoprazole), Prevacid (Lansoprazole), Nexium (Esomeprazole), Aciphex (Rabeprazole), Dexilant (Dexlansoprazole)?  Pantoprazole BID   4. Have you tried any OTC acid reducing medications? (If so, which medications have you tried?) Zantac (Ranitidine), Pepcid (Famotidine), Tagamet (Cimetidine) Tums, Rolaids, Maalox, Mylanta.  No   5. What was the outcome of taking the medications which you have for these symptoms?  Not helping   6. Have you experienced any recent changes in your bowel habits?  no  7. Have you had any new life stressors or diet changes?  Eating small meals through out the day   On a GERD diet   8. Does anything make your symptoms better?  No   9. Have you had any recent blood work, imaging, or procedures done? If yes, what were those?   EGD/ colonoscopy 6/20    Protocols used: [facility]

## 2025-06-24 NOTE — TELEPHONE ENCOUNTER
Spoke with pt, reviewed provider note, pt agreeable these symptoms are ongoing. She would like provider to place order for GES, she is aware to call central sched

## 2025-06-27 LAB — TTG IGA SER IA-ACNC: <0.4 U/ML (ref ?–10)

## 2025-06-30 PROCEDURE — 88305 TISSUE EXAM BY PATHOLOGIST: CPT | Performed by: PATHOLOGY

## 2025-06-30 PROCEDURE — 88342 IMHCHEM/IMCYTCHM 1ST ANTB: CPT | Performed by: PATHOLOGY

## 2025-06-30 PROCEDURE — 88341 IMHCHEM/IMCYTCHM EA ADD ANTB: CPT | Performed by: PATHOLOGY

## 2025-07-06 DIAGNOSIS — K21.9 GASTROESOPHAGEAL REFLUX DISEASE, UNSPECIFIED WHETHER ESOPHAGITIS PRESENT: ICD-10-CM

## 2025-07-06 DIAGNOSIS — B00.1 COLD SORE: ICD-10-CM

## 2025-07-07 RX ORDER — PANTOPRAZOLE SODIUM 40 MG/1
40 TABLET, DELAYED RELEASE ORAL 2 TIMES DAILY
Qty: 60 TABLET | Refills: 0 | Status: SHIPPED | OUTPATIENT
Start: 2025-07-07

## 2025-07-08 RX ORDER — VALACYCLOVIR HYDROCHLORIDE 500 MG/1
500 TABLET, FILM COATED ORAL DAILY
Qty: 30 TABLET | Refills: 0 | Status: SHIPPED | OUTPATIENT
Start: 2025-07-08 | End: 2025-08-07

## 2025-07-17 DIAGNOSIS — A04.8 H. PYLORI INFECTION: Primary | ICD-10-CM

## 2025-07-17 NOTE — TELEPHONE ENCOUNTER
Spoke with pt, reviewed h pylori regimen with her she understood. Pt aware to submit stool study in 1 month after last antibx dose off PPI for 2 weeks

## 2025-07-18 RX ORDER — BISMUTH SUBSALICYLATE 262 MG/1
262 TABLET, CHEWABLE ORAL
Qty: 56 TABLET | Refills: 0 | Status: SHIPPED | OUTPATIENT
Start: 2025-07-18 | End: 2025-08-01

## 2025-07-18 RX ORDER — METRONIDAZOLE 500 MG/1
500 TABLET ORAL EVERY 6 HOURS
Qty: 56 TABLET | Refills: 0 | Status: SHIPPED | OUTPATIENT
Start: 2025-07-18 | End: 2025-08-01

## 2025-07-18 RX ORDER — TETRACYCLINE HYDROCHLORIDE 500 MG/1
500 CAPSULE ORAL 4 TIMES DAILY
Qty: 56 CAPSULE | Refills: 0 | Status: SHIPPED | OUTPATIENT
Start: 2025-07-18 | End: 2025-08-01